# Patient Record
Sex: FEMALE | Race: BLACK OR AFRICAN AMERICAN | Employment: FULL TIME | URBAN - METROPOLITAN AREA
[De-identification: names, ages, dates, MRNs, and addresses within clinical notes are randomized per-mention and may not be internally consistent; named-entity substitution may affect disease eponyms.]

---

## 2017-08-20 ENCOUNTER — ED HISTORICAL/CONVERTED ENCOUNTER (OUTPATIENT)
Dept: OTHER | Age: 47
End: 2017-08-20

## 2017-10-04 ENCOUNTER — OP HISTORICAL/CONVERTED ENCOUNTER (OUTPATIENT)
Dept: OTHER | Age: 47
End: 2017-10-04

## 2017-10-06 ENCOUNTER — OP HISTORICAL/CONVERTED ENCOUNTER (OUTPATIENT)
Dept: OTHER | Age: 47
End: 2017-10-06

## 2017-11-08 ENCOUNTER — OP HISTORICAL/CONVERTED ENCOUNTER (OUTPATIENT)
Dept: OTHER | Age: 47
End: 2017-11-08

## 2017-11-15 ENCOUNTER — OP HISTORICAL/CONVERTED ENCOUNTER (OUTPATIENT)
Dept: OTHER | Age: 47
End: 2017-11-15

## 2017-11-26 ENCOUNTER — ED HISTORICAL/CONVERTED ENCOUNTER (OUTPATIENT)
Dept: OTHER | Age: 47
End: 2017-11-26

## 2019-11-07 ENCOUNTER — OP HISTORICAL/CONVERTED ENCOUNTER (OUTPATIENT)
Dept: OTHER | Age: 49
End: 2019-11-07

## 2020-01-07 ENCOUNTER — TELEPHONE (OUTPATIENT)
Dept: NEUROLOGY | Age: 50
End: 2020-01-07

## 2020-01-07 NOTE — TELEPHONE ENCOUNTER
Call patient and schedule for EMG/NCS BUE for CTS per MD     Left Message - on home/mobile number listed letting her know I was calling in regards to scheduling her for an EMG/NCS BUE for CTS. Asking for a call back.

## 2020-09-08 ENCOUNTER — NURSE TRIAGE (OUTPATIENT)
Dept: OBGYN CLINIC | Age: 50
End: 2020-09-08

## 2020-09-08 DIAGNOSIS — N63.10 MASSES OF BOTH BREASTS: Primary | ICD-10-CM

## 2020-09-08 DIAGNOSIS — N63.20 MASSES OF BOTH BREASTS: Primary | ICD-10-CM

## 2020-09-08 NOTE — TELEPHONE ENCOUNTER
Patient left a message on triage voicemail requesting orders be submitted for her 6 month follow up mammogram and ultrasound to [facility]. Spoke with patient and advised her the orders will be submitted today.

## 2020-09-21 ENCOUNTER — DOCUMENTATION ONLY (OUTPATIENT)
Dept: OBGYN CLINIC | Age: 50
End: 2020-09-21

## 2020-10-01 DIAGNOSIS — N63.10 MASSES OF BOTH BREASTS: ICD-10-CM

## 2020-10-01 DIAGNOSIS — N63.20 MASSES OF BOTH BREASTS: ICD-10-CM

## 2020-10-13 ENCOUNTER — TRANSCRIBE ORDER (OUTPATIENT)
Dept: SCHEDULING | Age: 50
End: 2020-10-13

## 2020-10-13 DIAGNOSIS — R20.2 PARESTHESIA: Primary | ICD-10-CM

## 2020-10-29 ENCOUNTER — APPOINTMENT (OUTPATIENT)
Dept: PHYSICAL THERAPY | Age: 50
End: 2020-10-29

## 2020-12-03 ENCOUNTER — HOSPITAL ENCOUNTER (OUTPATIENT)
Dept: PHYSICAL THERAPY | Age: 50
Discharge: HOME | End: 2020-12-03
Payer: MEDICAID

## 2020-12-03 PROCEDURE — 97163 PT EVAL HIGH COMPLEX 45 MIN: CPT

## 2020-12-03 NOTE — PROGRESS NOTES
[address] [address] [address] [facility] [address] Suite [facility], [facility]  Ph: [phone]    Fax: [phone]    Initial Evaluation/Plan of Care/Statement of Necessity for Physical Therapy Services     Patient name: Denae Clayton   : 1970  [x]  Patient  Verified Provider#: [id number]    Start of Care: 12/3/2020        Referral source: [facility] Return visit to MD: 2020     Medical/Treatment Diagnosis: Traumatic ischemia of muscle, [facility]. 6XXD]  Pain in leg, unspecified [M79.606]  Cramp and spasm [R25.2]  Pain in left thigh [M79.652]    Payor: [facility] MEDICAID / Plan: [address] [place] [address] / Product Type: Managed Care Medicaid /       Prior Hospitalization: see medical history     Comorbidities: None  Prior Level of Function: Independent without device   Medications: Verified on Patient Summary List          Patient / Family readiness to learn indicated by: asking questions, trying to perform skills and interest  Persons(s) to be included in education: patient (P)  Barriers to Learning/Limitations: None  Patient Self Reported Health Status: fair  Rehabilitation Potential: good  Previous Treatment/Compliance: None   PMHx/Surgical Hx:  appendex removed   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 Barriers to progress: None  Motivation: Fair   Substance use: N/A  Cognition: A & O x 4  Onset Date: July     In time: 1:40 pm  Out time:2:30 Total Treatment Time (min): 50   Total Timed Codes (min): 1 1:1 Treatment Time ([facility] only): 50  Visit #: 1 Visit count could not be calculated. Make sure you are using a visit which is associated with an episode. SUBJECTIVE  Patient was referred to physical therapy due to numbness at the back of her left tight.  She reports electric shookcing sensation which started in July following a fall where she lost her consciousness  and at the ER dx with a heart attack. She was admitted for 2 1/2 weeks, she was mostly bedbound for 2 weeks at the hospital and was not given PT or allowed to walk much  but she did received home PT for 3 weeks and was  walking initially with a walker, now she walks without device. Patient stated at hospital they dx her with Rhabdomyolysis following a blood test, initially both legs were numb and with tingling sensation. She is taking medication but still c/o of numbness and tingling sensation primarily on left. Patient stated her neurologist also did an EMG but everything came back normal.  Activities that produce pain:sitting on hard surface for more then 10-15 min, laying down and sometimes walking. Activities that makes her feel better: nothing- Stated meds do not help and she hasn't try ice or heat. Patient reports functional limitations with  Sleeping and lifting her 21 y/o daughter who has CP. Goals: \" To relief the pain\"   PAIN:  Area of pain: localized to posterior thigh (hamstring) stops at knee joint  Pain Level (0-10 scale)  At rest: 7/10  With activity: 10/10  Worst: 10/10  Least: 6/10  Pain Level (0-10 scale) pre treatment: 7/10  Pain Level (0-10 scale) post treatment: 6-7/10  OBJECTIVE  Physical Findings   Ortho:   Posture: slight decreased calf muscle tone on left >right   Gait and Functional Mobility:  Ambulated with out device. Palpation: TTP on hamstring muscle belly and slight discomfort on ischial tuberosity   Swelling: none but patient repots feeling slight more swelling on L>R hamstring muscle   Gross findings: Gross PROM on (L) LE WNL on right reports mild discomofrot on right LE     Specific joints: *normal values in ()  KNEE        AROM          PROM                       MMT   R L R L R L   Extension (0)      4+ 4+   Flexion (145)  Prone      5 4- ! Cramping sensation        HIP     AROM       PROM             MMT   R L R L R L   Flexion (120)     5 4-   Extension (15)     4 2+! Abduction (40)     5 5   Adduction (30)     4 4   IR (40)         ER (40)         Additional comments: C/o pain in her back and cramping  when trying to raise her (L) leg    ANKLE                               AROM                     PROM                     MMT:   R L R L R L   Dorsiflexion (15)      5 5   Plantarflexion (50)         Inversion (35)          Eversion (25)         Additional comments:   Mobility Assessment:      Neurological: Reflexes / Sensations: numbness from ischia tuberosity to hamstring muscle belly reports about 80% compared to right which is 100%. SLR mild weakness on L>R LE     Gait and Functional Mobility:  Transfers:   Bed mobility: Independent   Sit to/from Supine: Independent   Sit to/from stands: independent   Gait: 2 min walk test 347 ft no device, presents with decreased hip extension on left LE and reports tightness of her hamstring after 1 min of walking but was able to do 2 min. Assistive device:  None     Balance:   SLS right LE 10sec / Left LE 6 sec   Tendem = 30sec each side     Modality rationale: decrease edema, decrease inflammation, decrease pain and increase tissue extensibility to improve the patients ability to reduce thigh pain in sitting   Min Type Additional Details    [] Estim: []UnAtt   []Att       []TENS instruct                  []IFC  []Premod   []NMES []w/US   []w/ice   []w/heat  Position:                                     Location:    []  Ice     []  Heat  []  Ice massage Position:  Location:    []  Traction: [] Cervical       []Lumbar                       []Intermittent   []Continuous                     Lbs:  []W/heat               []W/heat and Estim    []  Ultrasound: []Continuous   [] Pulsed at:                           []1MHz   []3MHz Location:  W/cm2:    [] Skin assessment post-treatment:   []intact []redness- no adverse reaction   []redness - adverse reaction:     Decline ice post session.    5 min Therapeutic Exercise:  [x] See flow sheet :quad set, heel slides, bridges    Rationale: increase strength and improve coordination to improve the patients ability to reduce thigh pain. With   [x] TE   [] TA   [] neuro Patient Education: [x] Review HEP    [x] Progressed/Changed HEP based on:   [] positioning   [] body mechanics   [] transfers   [] heat/ice application    [] other:      ASSESSMENT/Changes in Function:   Patient is a 51 y/o female referred to OP physical therapy with dx of traumpatic Rhabdomyolysis and left leg pain. Patient primary complaint is cramping, numbness and tingling sensation on left hamstring muscle belly. She presents with  LE weakness,  noted decreased  hip extension strength, decreased balance and decreased sensation. Patient reports difficulty sitting for prolong periods, lifting and carrying for her disabled child. Patient will benefit from skilled PT services to improve static/dynamic balance, increased strength and overall mobility and IADL's. Patient POC with include patient education, HEP, there-ex, strengthening, flexibility, balance and gait training. Problem List/Impairments: pain affecting function, decrease strength, edema affecting function, impaired gait/ balance, decrease ADL/ functional abilitiies, decrease activity tolerance and decrease flexibility/ joint mobility  Frequency / Duration: Patient to be seen 1-2 times per week for 5-6  weeks. Certification Period: 12/3/2020-02/3/2020  Treatment Plan may include any combination of the following: Therapeutic exercise, Therapeutic activities, Neuromuscular re-education, Physical agent/modality, Gait/balance training, Manual therapy, Patient education and Functional mobility training    Patient/ Caregiver education and instruction: exercises  Patient Goal (s): To relief the pain  Short Term Goals: To be accomplished in 1-2 weeks:  1. Patient will be independent with HEP to augment POC  2.  Patient will strength in LEs will increased by 1-2 grades in 4-5 weeks  3. Patient will be able to perform a SLS in Left LE for 10 sec to improve balance and stability      Long Term Goals: To be accomplished in 5-6 weeks:  1. Patient will report decreased pain and discomfort on left thigh >=2/10 to improve her mobility   2. Patient will be able to report less discomfort and pain when lifting and carrying her disabled daughter   3. Patient will be able to sleep through the night without pain making her up to improve her quality of life. [x]  Plan of care has been reviewed with PTA. The Plan of Care is based on information from the initial evaluation. Lima Arzate, PT 12/3/2020   ________________________________________________________________________    I certify that the above Therapy Services are being furnished while the patient is under my care. I agree with the treatment plan and certify that this therapy is necessary.     [de-identified] Signature:____________________  Date:____________Time: _________

## 2020-12-07 ENCOUNTER — OFFICE VISIT (OUTPATIENT)
Dept: ENDOCRINOLOGY | Age: 50
End: 2020-12-07
Payer: MEDICAID

## 2020-12-07 VITALS
HEART RATE: 82 BPM | BODY MASS INDEX: 22.28 KG/M2 | TEMPERATURE: 98.7 F | DIASTOLIC BLOOD PRESSURE: 69 MMHG | WEIGHT: 121.1 LBS | SYSTOLIC BLOOD PRESSURE: 92 MMHG | HEIGHT: 62 IN

## 2020-12-07 DIAGNOSIS — R94.6 ABNORMAL THYROID FUNCTION TEST: Primary | ICD-10-CM

## 2020-12-07 PROBLEM — F41.9 ANXIETY AND DEPRESSION: Status: ACTIVE | Noted: 2020-12-07

## 2020-12-07 PROBLEM — F32.A ANXIETY AND DEPRESSION: Status: ACTIVE | Noted: 2020-12-07

## 2020-12-07 PROCEDURE — 99204 OFFICE O/P NEW MOD 45 MIN: CPT | Performed by: INTERNAL MEDICINE

## 2020-12-07 RX ORDER — DICLOFENAC SODIUM 75 MG/1
TABLET, DELAYED RELEASE ORAL
COMMUNITY
Start: 2020-11-06 | End: 2022-08-15

## 2020-12-07 RX ORDER — HYDROXYZINE PAMOATE 25 MG/1
CAPSULE ORAL
COMMUNITY
Start: 2020-09-16 | End: 2022-08-15

## 2020-12-07 RX ORDER — DULOXETIN HYDROCHLORIDE 60 MG/1
CAPSULE, DELAYED RELEASE ORAL
COMMUNITY
Start: 2020-12-02 | End: 2022-08-15

## 2020-12-07 RX ORDER — TRAZODONE HYDROCHLORIDE 100 MG/1
TABLET ORAL
COMMUNITY
Start: 2020-11-09 | End: 2022-08-15

## 2020-12-07 NOTE — PROGRESS NOTES
History and Physical    Patient: Tim Díaz MRN: [id number]  SSN: [id number]    YOB: 1970  Age: 52 y.o. Sex: female      Subjective:      Tim Díaz is a 52 y.o. female with past medical history of anxiety and depression is sent to me by primary care provider Zane Briceño for thyroid issues. Patient says she was diagnosed with slightly overactive thyroid a few years back. She is not able to give me a lot of details but she tells me that it was only slightly abnormal, so she was not given any treatment. However, she has been experiencing anxiety, palpitations, tremors, heat intolerance, frequent bowel movements, hair fall since past few months. Labs were abnormal in August 2020. Symptoms: Heat intolerance, tremors, palpitations, hair fall, 3-4 bowel movements every day. Patient does not have menstrual cycle because she had a hysterectomy in 2017. She also has insomnia, anxiety and depression for which she takes trazodone, Cymbalta. Prior history of thyroid problems: Yes, borderline hyperthyroid a few years back, no treatment was given  Recent steroid treatments: No  High dose Biotin supplemnts:  No  Recent URI:  No  Tenderness in neck:  No  Swelling in neck:  No  Family history of thyroid problems:  No  Personal/family history of autoimmune diseases:  No  smoking:  No  Change in appearance of eyes/ redness/ eye irritation: No  Personal history of cardiac disease: No  Personal history of osteoporosis/fragility fractures: No    History reviewed. No pertinent past medical history.   Past Surgical History:   Procedure Laterality Date    HX APPENDECTOMY      HX WISDOM TEETH EXTRACTION      VAG HYST,RMV TUBE/OVARY        Family History   Problem Relation Age of Onset    Sickle Cell Anemia Mother     Hypertension Father     Heart Failure Father     Heart Attack Brother     Seizures Brother      Social History     Tobacco Use    Smoking status: Never Smoker    Smokeless tobacco: Never Used   Substance Use Topics    Alcohol use: Not Currently      Prior to Admission medications    Medication Sig Start Date End Date Taking? Authorizing Provider   aspirin-calcium carbonate 81 mg-300 mg calcium(777 mg) tab 81 mg = 1 tab each dose, PO, daily with breakfast, # 30 tab, 11 Refills, [id number] 8/1/20  Yes Provider, Historical   diclofenac EC (VOLTAREN) 75 mg EC tablet take 1 tablet by mouth twice a day 11/6/20  Yes Provider, Historical   DULoxetine (CYMBALTA) 60 mg capsule take 1 capsule by mouth once daily 12/2/20  Yes Provider, Historical   traZODone (DESYREL) 100 mg tablet take 1 tablet by mouth at bedtime if needed 11/9/20  Yes Provider, Historical   hydrOXYzine pamoate (VISTARIL) 25 mg capsule take 1 capsule by mouth every 8 hours if needed 9/16/20  Yes Provider, Historical        No Known Allergies    Review of Systems:  ROS    A comprehensive review of systems was preformed and it is negative except mentioned in HPI    Objective:     Vitals:    12/07/20 1014   BP: 92/69   Pulse: 82   Temp: 98.7 °F (37.1 °C)   TempSrc: Temporal   Weight: 121 lb 1.6 oz (54.9 kg)   Height: 5' 2\" (1.575 m)        Physical Exam:    Physical Exam  Vitals signs and nursing note reviewed. Constitutional:       Appearance: Normal appearance. HENT:      Head: Normocephalic and atraumatic. Eyes:      Extraocular Movements: Extraocular movements intact. Neck:      Musculoskeletal: Neck supple. Comments: No thyromegaly, no nodules  Cardiovascular:      Rate and Rhythm: Normal rate and regular rhythm. Pulmonary:      Effort: Pulmonary effort is normal.      Breath sounds: Normal breath sounds. Abdominal:      General: Bowel sounds are normal.      Palpations: Abdomen is soft. Musculoskeletal:         General: No swelling. Skin:     General: Skin is warm and dry. Neurological:      General: No focal deficit present.       Mental Status: She is alert and oriented to person, place, and time. Psychiatric:         Mood and Affect: Mood normal.         Behavior: Behavior normal.         Thought Content: Thought content normal.         Judgment: Judgment normal.          Labs and Imaging:    Reviewed labs from the primary care physician:    8-:  TSH suppressed at 0.426 (0.45-4.5)  Free T4 normal at 1.8 (1.7-4.9)  Total T4 normal at 6.6  T3 uptake normal at 28    Normal WBC and liver enzymes  Last 3 Recorded Weights in this Encounter    12/07/20 1014   Weight: 121 lb 1.6 oz (54.9 kg)        No results found for: HBA1C, HGBE8, HGC1QNYZ, LZI5HGBI, ADH5UVQN     Assessment:     Patient Active Problem List   Diagnosis Code    Abnormal thyroid function test R94.6    Anxiety and depression F41.9, F32.9           Plan:     Abnormal thyroid function test:  I reviewed records including labs and notes from primary care physician.    8-:  TSH suppressed at 0.426 (0.45-4.5)  Free T4 normal at 1.8 (1.7-4.9)  Total T4 normal at 6.6  T3 uptake normal at 28    Normal WBC and liver enzymes    Clinically patient has symptoms of hyperthyroidism. Plan:  Check complete thyroid function panel and antibodies. After I review the results, I will decide if thyroid uptake scan and ultrasound is needed. Pulse rate is normal without any beta-blocker. I will see her back in my office in 3 weeks. Insomnia:  Patient is taking trazodone as needed. Anxiety and depression:  Patient is on Cymbalta per primary care provider.   Orders Placed This Encounter    TSH AND FREE T4    T3 TOTAL    THYROID STIMULATING IMMUNOGLOBULIN    TSH RECEPTOR AB    THYROID PEROXIDASE (TPO) AB        Signed By: Brittany Doss MD     December 7, 2020      Return to clinic 3 weeks

## 2020-12-08 LAB
T3 SERPL-MCNC: 70 NG/DL (ref 71–180)
T4 FREE SERPL-MCNC: 1.09 NG/DL (ref 0.82–1.77)
THYROPEROXIDASE AB SERPL-ACNC: <9 IU/ML (ref 0–34)
TSH RECEP AB SER-ACNC: <1.1 IU/L (ref 0–1.75)
TSH SERPL DL<=0.005 MIU/L-ACNC: 0.61 UIU/ML (ref 0.45–4.5)
TSI SER-ACNC: <0.1 IU/L (ref 0–0.55)

## 2020-12-17 ENCOUNTER — HOSPITAL ENCOUNTER (OUTPATIENT)
Dept: PHYSICAL THERAPY | Age: 50
Discharge: HOME | End: 2020-12-17
Payer: MEDICAID

## 2020-12-17 PROCEDURE — 97110 THERAPEUTIC EXERCISES: CPT

## 2020-12-17 NOTE — PROGRESS NOTES
PT DAILY TREATMENT NOTE - MCR     Patient Name: Arabella Delgado  Date:2020  : 1970  [x]  Patient  Verified  Payor: [address] / Plan: [address] [facility] / Product Type: Managed Care Medicaid /    Treatment Area: Pain in left thigh [M79.652]  Pain in leg, unspecified [M79.606]  Traumatic ischemia of muscle, subsequent encounter [facility]. 6XXD]   Next MD APPT:   In time:2:40   Out time: 3:25  Total Treatment Time (min): 45  Total Timed Codes (min): 30  1:1 Treatment Time ( [address] only): 30  Visit #:  Visit count could not be calculated. Make sure you are using a visit which is associated with an episode. SUBJECTIVE  Pain Level (0-10 scale) pre treatment: 6/10 in left leg and reports 8/10 LBP   Any medication changes, allergies to medications, adverse drug reactions, diagnosis change, or new procedure performed?: [x] No    [] Yes (see summary sheet for update)  Subjective functional status/changes:   [] No changes reported  Patient reports still having left leg pain and now her back started to bother her too. Also reports having constant dizziness, no s/o of double vision or recent falls. Stated she is drinking enough water at home. OBJECTIVE    Modality rationale: decrease pain and increase tissue extensibility to improve the patients ability to decreased pain with ambulation.    Min Type Additional Details    [] Estim: []UnAtt   []Att       []TENS instruct                  []IFC  []Premod   []NMES                     []Other:  []w/US   []w/ice   []w/heat  Position:  Location:   15 []  Ice     [x]  Heat  []  Ice massage Position:R side lying    Location:lower back    []  Traction: [] Cervical       []Lumbar                       [] Prone          []Supine                       []Intermittent   []Continuous Lbs:  []w/heat  []W/heat and Estim    []  Ultrasound: []Continuous   [] Pulsed at:                           []1MHz   []3MHz Location:  W/cm2:      [] Skin assessment post-treatment:   []intact  []redness- no adverse reaction   []redness - adverse reaction:   30 min Therapeutic Exercise:  [x] See flow sheet :Exercises program initiated    Rationale: increase ROM and increase strength to improve the patients ability to improve movement and reduce pain. With   [] TE   [] TA   [] neuro   [] other: Patient Education: [] Review HEP    [] Progressed/Changed HEP based on:   [] positioning   [] body mechanics   [] transfers   [] heat/ice application    [] other:      Other Objective/Functional Measures:   Reports about 90% loss in sensation on left hamstring muslclalit cardona  Reports discomfort on her lower back with hip extension, added a pillow under stomach for support with rest of prone activities. Pain Level (0-10 scale) post treatment: 4/10 reports some relief post heat    ASSESSMENT/Changes in Function:   Patient tolerated exercises and stretfches without report of pain or discomfort, patient stated she has more sensation issues on the her back of her leg. Therapist educated the sensation may or may not come back and regarding pain with sitting recommended to add a pillow or cushion for support. Patient encouraged to follow up with MD regarding both back pain and dizziness symtoms that she is reporting to expreience following the fall. Patient will continue to benefit from skilled PT services to modify and progress therapeutic interventions, address functional mobility deficits, address ROM deficits, address strength deficits, analyze and address soft tissue restrictions, analyze and cue movement patterns, analyze and modify body mechanics/ergonomics and assess and modify postural abnormalities to attain remaining goals. []  See Plan of Care  []  See progress note/recertification  []  See Discharge Summary       GOALS/Progress towards goals:      Short Term Goals: To be accomplished in 1-2 weeks:  1. Patient will be independent with HEP to augment POC  2. Patient will strength in LEs will increased by 1-2 grades in 4-5 weeks  3. Patient will be able to perform a SLS in Left LE for 10 sec to improve balance and stability      Long Term Goals: To be accomplished in 5-6 weeks:  1. Patient will report decreased pain and discomfort on left thigh >=2/10 to improve her mobility   2. Patient will be able to report less discomfort and pain when lifting and carrying her disabled daughter   3.  Patient will be able to sleep through the night without pain making her up to improve her quality of life.      PLAN  [x]  Upgrade activities as tolerated     []  Continue plan of care  []  Update interventions per flow sheet       []  Discharge due to:_  []  Other:_      Kimberlee Yanes, PT 12/17/2020

## 2020-12-21 ENCOUNTER — HOSPITAL ENCOUNTER (OUTPATIENT)
Dept: PHYSICAL THERAPY | Age: 50
Discharge: HOME | End: 2020-12-21
Payer: MEDICAID

## 2020-12-21 PROCEDURE — 97110 THERAPEUTIC EXERCISES: CPT

## 2020-12-21 NOTE — PROGRESS NOTES
PT DAILY TREATMENT NOTE - MCR     Patient Name: Huang Ontiveros  Date:2020  : 1970  [x]  Patient  Verified  Payor: Rojelio Sean / Plan: [facility] / Product Type: Managed Care Medicaid /    Treatment Area: Pain in left thigh [M79.652]  Pain in leg, unspecified [M79.606]  Traumatic ischemia of muscle, subsequent encounter Nathan Schuler. 6XXD]   Next MD APPT: Next week (no date provided)  In time: 1:02 pm Out time: 1:52pm   Total Treatment Time (min): 50  Total Timed Codes (min): 45  1:1 Treatment Time ( [address] only): 45  Visit #:  Visit count could not be calculated. Make sure you are using a visit which is associated with an episode. SUBJECTIVE  Pain Level (0-10 scale) pre treatment: 0/10 in left leg  Any medication changes, allergies to medications, adverse drug reactions, diagnosis change, or new procedure performed?: [x] No    [] Yes (see summary sheet for update)  Subjective functional status/changes:   [] No changes reported  Patient reports no pain but numbness at the back of her left thigh and stated she was having some cramps last night. Patient stated her dizziness is subsiding but she still has back pain, she called the doctor and left a message. OBJECTIVE    Modality rationale: decrease pain and increase tissue extensibility to improve the patients ability to decreased pain with ambulation.    Min Type Additional Details    [] Estim: []UnAtt   []Att       []TENS instruct                  []IFC  []Premod   []NMES                     []Other:  []w/US   []w/ice   []w/heat  Position:  Location:    []  Ice     []  Heat  []  Ice massage Position:R side lying    Location:lower back    []  Traction: [] Cervical       []Lumbar                       [] Prone          []Supine                       []Intermittent   []Continuous Lbs:  []w/heat  []W/heat and Estim    []  Ultrasound: []Continuous   [] Pulsed at:                           []1MHz   []3MHz Location:  W/cm2:      [] Skin assessment post-treatment:   []intact  []redness- no adverse reaction   []redness - adverse reaction:   45 min Therapeutic Exercise:  [x] See flow sheet :Exercises program initiated    Rationale: increase ROM and increase strength to improve the patients ability to improve movement and reduce pain. With   [] TE   [] TA   [] neuro   [] other: Patient Education: [] Review HEP    [] Progressed/Changed HEP based on:   [] positioning   [] body mechanics   [] transfers   [] heat/ice application    [] other:      Other Objective/Functional Measures: Added standing exercises and increased reps activties. Therapist provided with rest between set and provided water brakes during exercises. Patient educated on pacing. Pillow placed under abdomen with prone activitreis for back support    Pain Level (0-10 scale) post treatment: 0/10 reports some relief post heat    ASSESSMENT/Changes in Function:   Patient reprots she still feels weak and have sensation deficits on left thigh, she reports no difficulties with exercises, nor report of pain. She presents with L >R LE weakness and required verbal/tactile cues to avoid compensation with exercises. Patient encouraged to follow up with MD regarding both back pain and dizziness symtoms that she was reporting to expreience following the fall. Patient will continue to benefit from skilled PT services to modify and progress therapeutic interventions, address functional mobility deficits, address ROM deficits, address strength deficits, analyze and address soft tissue restrictions, analyze and cue movement patterns, analyze and modify body mechanics/ergonomics and assess and modify postural abnormalities to attain remaining goals. []  See Plan of Care  []  See progress note/recertification  []  See Discharge Summary       GOALS/Progress towards goals:      Short Term Goals: To be accomplished in 1-2 weeks:  1.  Patient will be independent with HEP to augment POC [] Met [] Not met [] Partially met   2. Patient will strength in LEs will increased by 1-2 grades in 4-5 weeks [] Met [] Not met [] Partially met   3. Patient will be able to perform a SLS in Left LE for 10 sec to improve balance and stability [] Met [] Not met [] Partially met      Long Term Goals: To be accomplished in 5-6 weeks:  1. Patient will report decreased pain and discomfort on left thigh >=2/10 to improve her mobility [] Met [] Not met [] Partially met   2. Patient will be able to report less discomfort and pain when lifting and carrying her disabled daughter [] Met [] Not met [] Partially met   3. Patient will be able to sleep through the night without pain making her up to improve her quality of life.  [] Met [] Not met [] Partially met      PLAN  [x]  Upgrade activities as tolerated     []  Continue plan of care  []  Update interventions per flow sheet       []  Discharge due to:_  []  Other:_      Hipolito Forman, PT 12/21/2020

## 2021-01-05 ENCOUNTER — OFFICE VISIT (OUTPATIENT)
Dept: ENDOCRINOLOGY | Age: 51
End: 2021-01-05
Payer: MEDICAID

## 2021-01-05 VITALS
HEART RATE: 93 BPM | WEIGHT: 138.6 LBS | SYSTOLIC BLOOD PRESSURE: 99 MMHG | BODY MASS INDEX: 25.51 KG/M2 | HEIGHT: 62 IN | TEMPERATURE: 98.6 F | DIASTOLIC BLOOD PRESSURE: 71 MMHG

## 2021-01-05 DIAGNOSIS — R94.6 ABNORMAL THYROID FUNCTION TEST: Primary | ICD-10-CM

## 2021-01-05 PROCEDURE — 99214 OFFICE O/P EST MOD 30 MIN: CPT | Performed by: INTERNAL MEDICINE

## 2021-01-05 RX ORDER — LIDOCAINE 50 MG/G
PATCH TOPICAL
COMMUNITY
Start: 2020-12-13 | End: 2022-08-15

## 2021-01-05 RX ORDER — METHOCARBAMOL 750 MG/1
TABLET, FILM COATED ORAL
COMMUNITY
Start: 2020-12-13 | End: 2022-08-15

## 2021-01-05 RX ORDER — METHYLPREDNISOLONE 4 MG/1
TABLET ORAL
COMMUNITY
Start: 2020-12-13 | End: 2022-04-25

## 2021-01-05 RX ORDER — CYCLOBENZAPRINE HCL 10 MG
TABLET ORAL
COMMUNITY
Start: 2020-12-26 | End: 2022-08-15

## 2021-01-05 RX ORDER — ASPIRIN 81 MG/1
TABLET ORAL
COMMUNITY
Start: 2021-01-02 | End: 2022-08-15

## 2021-01-05 NOTE — PROGRESS NOTES
History and Physical    Patient: Florencio Wang MRN: [id number]  SSN: [id number]    YOB: 1970  Age: 52 y.o. Sex: female      Subjective:      Florencio Wang is a 52 y.o. female with past medical history of anxiety and depression is here for follow-up of thyroid issues. She was initially sent to me by primary care provider Elizabeth Gutierrez. After the last visit patient had detailed thyroid labs done as well as thyroid antibodies checked and she is here to follow-up on the results. She denies any change in her symptoms since the last visit. Initial history:  Patient says she was diagnosed with slightly overactive thyroid a few years back. She is not able to give me a lot of details but she tells me that it was only slightly abnormal, so she was not given any treatment. However, she has been experiencing anxiety, palpitations, tremors, heat intolerance, frequent bowel movements, hair fall since past few months. Labs were abnormal in August 2020. Symptoms: Heat intolerance, tremors, palpitations, hair fall, 3-4 bowel movements every day. Patient does not have menstrual cycle because she had a hysterectomy in 2017. She also has insomnia, anxiety and depression for which she takes trazodone, Cymbalta.   Prior history of thyroid problems: Yes, borderline hyperthyroid a few years back, no treatment was given  Recent steroid treatments: No  High dose Biotin supplemnts:  No  Recent URI:  No  Tenderness in neck:  No  Swelling in neck:  No  Family history of thyroid problems:  No  Personal/family history of autoimmune diseases:  No  smoking:  No  Change in appearance of eyes/ redness/ eye irritation: No  Personal history of cardiac disease: No  Personal history of osteoporosis/fragility fractures: No    Past Medical History:   Diagnosis Date    Thyroid disease      Past Surgical History:   Procedure Laterality Date    HX APPENDECTOMY      HX WISDOM TEETH EXTRACTION      WA VAG HYST,RMV TUBE/OVARY        Family History   Problem Relation Age of Onset    Sickle Cell Anemia Mother     Hypertension Father     Heart Failure Father     Heart Attack Brother     Seizures Brother      Social History     Tobacco Use    Smoking status: Never Smoker    Smokeless tobacco: Never Used   Substance Use Topics    Alcohol use: Not Currently      Prior to Admission medications    Medication Sig Start Date End Date Taking? Authorizing Provider   aspirin delayed-release 81 mg tablet  1/2/21  Yes Provider, Historical   cyclobenzaprine (FLEXERIL) 10 mg tablet take 1 tablet by mouth at bedtime once daily if needed for 20 DAYS 12/26/20  Yes Provider, Historical   lidocaine (LIDODERM) 5 % APPLY ONE PATCH TRANSDERMALLY DAILY 12/13/20  Yes Provider, Historical   methocarbamoL (ROBAXIN) 750 mg tablet take 1 tablet by mouth four times a day 12/13/20  Yes Provider, Historical   methylPREDNISolone (MEDROL DOSEPACK) 4 mg tablet use as directed [address] 12/13/20  Yes Provider, Historical   diclofenac EC (VOLTAREN) 75 mg EC tablet take 1 tablet by mouth twice a day 11/6/20  Yes Provider, Historical   DULoxetine (CYMBALTA) 60 mg capsule take 1 capsule by mouth once daily 12/2/20  Yes Provider, Historical   traZODone (DESYREL) 100 mg tablet take 1 tablet by mouth at bedtime if needed 11/9/20  Yes Provider, Historical   hydrOXYzine pamoate (VISTARIL) 25 mg capsule take 1 capsule by mouth every 8 hours if needed 9/16/20  Yes Provider, Historical        No Known Allergies    Review of Systems:  ROS    A comprehensive review of systems was preformed and it is negative except mentioned in HPI    Objective:     Vitals:    01/05/21 1623   BP: 99/71   Pulse: 93   Temp: 98.6 °F (37 °C)   TempSrc: Temporal   Weight: 138 lb 9.6 oz (62.9 kg)   Height: 5' 2\" (1.575 m)        Physical Exam:    Physical Exam  Vitals signs and nursing note reviewed. Constitutional:       Appearance: Normal appearance. HENT:      Head: Normocephalic and atraumatic. Cardiovascular:      Rate and Rhythm: Normal rate and regular rhythm. Pulmonary:      Effort: Pulmonary effort is normal.      Breath sounds: Normal breath sounds. Neurological:      Mental Status: She is alert. Labs and Imaging:  Results for Kristin Dow (MRN [id number]) as of 1/5/2021 16:23   Ref. Range 12/7/2020 10:56   T4, Free Latest Ref Range: 0.82 - 1.77 ng/dL 1.09   T3, total Latest Ref Range: 71 - 180 ng/dL 70 (L)   TSH Latest Ref Range: 0.450 - 4.500 uIU/mL 0.613     Thyroid peroxidase Ab <9   0 - 34     Thyroid Stim Immunoglobulin <0.10   0.00 - 0.55     Thyrotropin Receptor Ab, serum <1.10   0.00 - 1.75         8-:  TSH suppressed at 0.426 (0.45-4.5)  Free T4 normal at 1.8 (1.7-4.9)  Total T4 normal at 6.6  T3 uptake normal at 28    Normal WBC and liver enzymes  Last 3 Recorded Weights in this Encounter    01/05/21 1623   Weight: 138 lb 9.6 oz (62.9 kg)        No results found for: HBA1C, HGBE8, HJY0VPXP, CJF1MZVF, KHT5VGGM     Assessment:     Patient Active Problem List   Diagnosis Code    Abnormal thyroid function test R94.6    Anxiety and depression F41.9, F32.9           Plan:     Abnormal thyroid function test:  8-:  TSH suppressed at 0.426 (0.45-4.5)  Free T4 normal at 1.8 (1.7-4.9)  Total T4 normal at 6.6  T3 uptake normal at 28    Normal WBC and liver enzymes    12-7-2020:  TSH normal at 0.63  Free T4 normal at 1.09  TPO, TSI and TRAB negative  Plan:  Since thyroid function test is completely normal now, no treatment is required. However, since TSH was slightly suppressed in the past, I would like to repeat labs in 3 months to make sure it continues to be normal.  Advised patient to talk to her primary care physician about optimizing treatment for anxiety. Insomnia:  Patient is taking trazodone as needed. Anxiety and depression:  Patient is on Cymbalta per primary care provider.   Orders Placed This Encounter    TSH AND FREE T4     Standing Status:   Future     Standing Expiration Date:   7/5/2021        Signed By: Agustina Mcmahon MD     January 5, 2021      Return to clinic 3 months

## 2021-01-05 NOTE — LETTER
1/5/2021 Patient: Nasim Montiel YOB: 1970 Date of Visit: 1/5/2021 Tevin Ku NP 
[address] [place] [id number] [address] Fax: [phone] Dear Tevin Ku NP, Thank you for referring Ms. Nasim Montiel to [address] [place] [address] for evaluation. My notes for this consultation are attached. If you have questions, please do not hesitate to call me. I look forward to following your patient along with you. Sincerely, Emmett Patel MD

## 2021-01-20 ENCOUNTER — HOSPITAL ENCOUNTER (OUTPATIENT)
Dept: PHYSICAL THERAPY | Age: 51
Discharge: HOME | End: 2021-01-20
Payer: MEDICAID

## 2021-01-20 PROCEDURE — 97162 PT EVAL MOD COMPLEX 30 MIN: CPT

## 2021-01-20 NOTE — PROGRESS NOTES
[address] [address] [facility] [address] [facility], [address] [place] [address]  Ph: [phone]    Fax: [phone]    Initial Evaluation/Plan of Care/Statement of Necessity for Physical Therapy Services     Patient name: Tk Walsh    Date/Start of Care:2021  : 1970  [x]  Patient  Verified Provider#: [id number]          Referral source: Eve Sosa PA-C Return visit to MD: 2 weeks from      Medical/Treatment Diagnosis: Low back pain [M54.5]   Payor: Ronda Engel / Plan: [employer] / Product Type: Managed Care Medicaid /       Prior Hospitalization: see medical history     Comorbidities: see chart   Prior Level of Function: Independent no device   Medications: Verified on Patient Summary List          Patient / Family readiness to learn indicated by: asking questions and trying to perform skills  Persons(s) to be included in education: patient (P)  Barriers to Learning/Limitations: None  Patient Self Reported Health Status: fair  Rehabilitation Potential: fair-good   Previous Treatment/Compliance: None   PMHx/Surgical Hx: Noon   Work Hx: child liliam of her daughter. Living Situation: Patient lives with  2 daugther. Barriers to progress: N/A  Motivation: Good  Substance use: N/A  Cognition: A & O x 4  Onset Date: 3 months ago    In time:11:32 Out time:12:25 Total Treatment Time (min): 53  Total Timed Codes (min): 10 1:1 Treatment Time ( only): 53  Visit #: 1 Visit count could not be calculated. Make sure you are using a visit which is associated with an episode. SUBJECTIVE  Patient reports  having back pain, that started about 3 months ago, no specific DALLIN. Stated she went to see an orthopedic and they did a xray and stated \"something is missing\" . As per  Patient it could be DJD, DDD but she doen't recall. Patient reports lower back pain on L>R side, reports difficulty with sweeping and mopping and carrying for her disabled child. Patient  reports having sharp pain when she bend forward it does not radiate and she does not report numbness or tingling sensation. Activities the produce the pain- banding, standing and walking >5 min   Activities that make it better - laying down, medication (she takes muscle relaxer) and sometimes sitting. Functional limitations: caring for her daughter, lifting and sweeping/mopping at home and doing her ADLs. PAIN:  Area of pain: Left >Right LS   Pain Level (0-10 scale)  At rest: 8/10 With activity: 10/10    Worst: 10/10  Least: 8/10   Pain Level (0-10 scale) pre treatment: 8/10  Pain Level (0-10 scale) post treatment: 8/10  Goals: \" To reduce the pain\"  OBJECTIVE:     Gait and Functional Mobility:  Transfers:  Bed mobility: independnet   Sit to/from Supine: independent  Sit to/from stands: independent   Rolling from prone to supine- reports increased back pain and difficulty  Gait: no major deficits noted.    Assistive device: ambulates without device     Physical Findings   Ortho:   Posture:  R pelvic anterior torsion, left pelvic retracted   Gait and Functional Mobility:  Ambulates without device   Palpation: TTP on L4 paraspinals with PA and on R/L side of L4 and left sacrum   Gross findings:    Swelling: N/A  Spine:   TRUNK ROM:     Flexion:                         [] N/A  []WNL  []Minimal  []Moderate  [x] Major 49 cm from third finger down to floor with increased pain immediately as was bending forward    Extension:                     [] N/A  []WNL  [x]Minimal  []Moderate  [] Major  Some relief   Right Lateral Flexion:    [] N/A  []WNL  []Minimal  [x]Moderate  [] Major  54cm from third finger down to floor with right side pain   Left Lateral Flexion:   [] N/A  []WNL  []Minimal  []Moderate  [] Major 61cm from third finger down to floor left side pain   Rotation to the Right:  [] N/A  [x]WNL  []Minimal  []Moderate  [] Major   Rotation to the Left:   [] N/A  [x]WNL  []Minimal  []Moderate  [] Major Right Side Glide:           [] N/A  []WNL  []Minimal  []Moderate  [] Major  Left Side Glide:   [] N/A  []WNL  []Minimal  []Moderate  [] Major  Additional comments:   Specific joints: *normal values in ()    Hip      AROM       PROM             MMT   R L R L R L   Flexion (120)     4- 4-   Extension (15)     3+p! 3+p! Abduction (40)     4- 4-   Adduction (30)     3+ 2+   IR (40)         ER (40)         Additional comments: reports pain in right /Left groin with hip abd   RHS flexibility 70deg   L HS flexibiltiy 65 deg     Knee          AROM          PROM                       MMT   R L R L R L   Extension (0)      4+ 4p! On quad    Flexion (145)     4 4- with pelvic rotaiton manual cues    Additional comments: L>R quad tightness     Ankle                                 AROM                       PROM                             MMT   R L R L R L   Dorsiflexion (15)     5  5   Plantarflexion (50)     4 4   Additional comments: able to do a few toe raise no discomfort on pain   Mobility Assessment:   Patient reports functional limitations with: banding, lifting and some ADLs     Neurological: Reflexes / Sensations: Reports discreased senation in her L HS about 90 % compared to right side. Able to walk on heels/toes without discomfort   Performs a deep squat but reports left side pain at initiation of movement. Balance:   SLS right LE 10sec / Left LE 6 sec   Tendem = 30sec each side     Special Tests:      Lumbar  Special Tests:    Trendelenberg: (-)    FABERS: R/L limited hip ER, reports  hip discomfort on hip flexor   Slump: (-)   H.S. SLR: (-)     Piriformis Ext: (-)    Marching: WNL     Modality rationale: decrease edema, decrease inflammation, decrease pain and increase tissue extensibility to improve the patients ability to move lumbar without pain.    Min Type Additional Details    [] Estim: []UnAtt   []Att       []TENS instruct                  []IFC  []Premod   []NMES []w/US   []w/ice []w/heat  Position:                                     Location:    []  Ice     []  Heat  []  Ice massage Position:  Location:    []  Traction: [] Cervical       []Lumbar                       []Intermittent   []Continuous                     Lbs:  []W/heat               []W/heat and Estim    []  Ultrasound: []Continuous   [] Pulsed at:                           []1MHz   []3MHz Location:  W/cm2:    [] Skin assessment post-treatment:   []intact []redness- no adverse reaction   []redness - adverse reaction:     * Decline heat post session  10 min Therapeutic Exercise:  [x] See flow sheet : HEP initiated and handout provided    Rationale: increase ROM, increase strength and improve coordination to improve the patients ability to improve back flexibility and mobility. With   [x] TE   [] TA   [] Neuro   [] SC   [] other: Patient Education: [x] Review HEP    [] Progressed/Changed HEP based on:   [] positioning   [] body mechanics   [] transfers   [] heat/ice application    [] other:      ASSESSMENT/Changes in Function:   Patient presents to OP skilled PT services with dx of lumbar back pain, patient reports x-ray was done and there is some abnormal structures as per patient. Patient presents with lumbar pain, decreased lumbar AROM , decreased LE strength, decreased core stability, decreased balance and LE flexibility. Patient may be experiencing with mechanical LBP she  will benefit from skilled PT services to address strength, core stability, flexible and balance to reduce pain and  improve ADLs and ambulation. Problem List/Impairments: pain affecting function, decrease ROM, decrease strength, edema affecting function, impaired gait/ balance, decrease ADL/ functional abilitiies and decrease flexibility/ joint mobility  Frequency / Duration: Patient to be seen 2 times per week for 12 treatments.   Certification Period: 1/20/21-3/30/21  Treatment Plan may include any combination of the following: Therapeutic exercise, Neuromuscular re-education, Physical agent/modality, Gait/balance training, Manual therapy, Patient education and Functional mobility training    Patient/ Caregiver education and instruction: exercises  Patient Goal (s): To reduce the pain   Short Term Goals: To be accomplished in 2-4  treatments:  1. Patient will be independent with her HEP to progress with her POC  2. Patient pain level will decreased on the VAS by 2-3 points   3. Patient HS /quad flexibility will improve to improve lumbar mobility   4. Patient will be able to perform a SLS 10 sec on each leg to improve her balance   5. Patient lumbar ROM will improve to minimal in all directions     Long Term Goals: To be accomplished in 12 treatments:  1. Patient will be able to carry her household ADLs activities with less pain and discomfort   2. Patient will be able to lift and care for her disabled child with decrease back pain   3. Patient will be able to stand/walk from then 10 min with less back pain and discomfort     [x]  Plan of care has been reviewed with PTA. The Plan of Care is based on information from the initial evaluation. Lima Arzate, PT 1/20/2021   ________________________________________________________________________    I certify that the above Therapy Services are being furnished while the patient is under my care. I agree with the treatment plan and certify that this therapy is necessary.     [de-identified] Signature:____________________  Date:____________Time: _________

## 2021-02-19 ENCOUNTER — HOSPITAL ENCOUNTER (OUTPATIENT)
Dept: PHYSICAL THERAPY | Age: 51
Discharge: HOME | End: 2021-02-19
Payer: MEDICAID

## 2021-02-19 PROCEDURE — 97110 THERAPEUTIC EXERCISES: CPT

## 2021-02-19 PROCEDURE — 97140 MANUAL THERAPY 1/> REGIONS: CPT

## 2021-02-19 NOTE — PROGRESS NOTES
PT DAILY TREATMENT NOTE - [facility]     Patient Name: Sophia Situ  Date:2021  : 1970  [x]  Patient  Verified  Payor: Juana Kaba / Plan: Suzanne Johns / Product Type: Managed Care Medicaid /    Treatment Area: Low back pain [M54.5]   Next MD APPT:   In  time:1:02 pm  Out time:1:49  Total Treatment Time (min): 45  Total Timed Codes (min): 45  1:1 Treatment Time ( [address] only): 45  Visit #:   Visit count could not be calculated. Make sure you are using a visit which is associated with an episode. SUBJECTIVE  Pain Level (0-10 scale) pre treatment: 8/10 Pain Level (0-10 scale) post treatment: 7/10  Any medication changes, allergies to medications, adverse drug reactions, diagnosis change, or new procedure performed?: [] No    [] Yes (see summary sheet for update)  Subjective functional status/changes:   [] No changes reported  Patient reports 8/10 left centralized lower back pain with movement, stated its really bad today. OBJECTIVE    Modality rationale: decrease edema, decrease inflammation, decrease pain, increase tissue extensibility and increase muscle contraction/control to improve the patients ability to reduce pain with ambulation. Min Type Additional Details    [] Estim: []UnAtt   []Att       []TENS instruct                  []IFC  []Premod   []NMES                     []Other:  []w/US   []w/ice   []w/heat  Position:  Location:    []  Ice     []  Heat  []  Ice massage Position:  Location:    []  Traction: [] Cervical       []Lumbar                       [] Prone          []Supine                       []Intermittent   []Continuous Lbs:  []w/heat  []W/heat and Estim    []  Ultrasound: []Continuous   [] Pulsed at:                           []1MHz   []3MHz Location:  W/cm2:      [] Skin assessment post-treatment:   []intact  []redness- no adverse reaction   []redness - adverse reaction:     * Decline heat or ES.   35 min Therapeutic Exercise:  [x] See flow sheet : Rationale: increase ROM, increase strength, improve coordination and improve balance to improve the patients ability to reduce pain with ambulation. 10  min Manual Therapy: Lumbar area    Rationale: decrease pain, increase ROM, increase tissue extensibility and decrease trigger points to improve the patients ability to reduce pain with mobility. With   [x] TE   [] TA   [] Neuro   [] SC   [] other: Patient Education: [] Review HEP    [] Progressed/Changed HEP based on:   [] positioning   [] body mechanics   [] transfers   [] Use of heat/ice    [] other:          Other Objective/Functional Measures:   POC for LBP was initiated today. Reports lumbar cramping on L side upon laying on bed. ASSESSMENT/Changes in Function:   Patient experienced left lumbar cramping during exercises, stated she's been having them frequently. Increaed tigthness noted with glut stretch on R>L LE, patient tolerated rest of exercises without pain, a pillow was placed under abdomen for support. Therapist provided HEP to continue to improve core stability and LE flexibility. Patient will continue to benefit from skilled PT services to modify and progress therapeutic interventions, address functional mobility deficits, address ROM deficits, address strength deficits, analyze and address soft tissue restrictions, analyze and cue movement patterns, analyze and modify body mechanics/ergonomics and assess and modify postural abnormalities to attain remaining goals. []  See Plan of Care  []  See progress note/recertification  []  See Discharge Summary       GOALS/Progress towards goals:      Patient/ Caregiver education and instruction: exercises  Patient Goal (s): To reduce the pain   Short Term Goals: To be accomplished in 2-4  treatments:  1. Patient will be independent with her HEP to progress with her POC [] Met [] Not met [] Partially met   2.  Patient pain level will decreased on the VAS by 2-3 points [] Met [] Not met [] Partially met   3. Patient HS /quad flexibility will improve to improve lumbar mobility [] Met [] Not met [] Partially met   4. Patient will be able to perform a SLS 10 sec on each leg to improve her balance [] Met [] Not met [] Partially met   5. Patient lumbar ROM will improve to minimal in all directions [] Met [] Not met [] Partially met      Long Term Goals: To be accomplished in 12 treatments:  1. Patient will be able to carry her household ADLs activities with less pain and discomfort [] Met [] Not met [] Partially met   2. Patient will be able to lift and care for her disabled child with decrease back pain [] Met [] Not met [] Partially met   3.  Patient will be able to stand/walk from then 10 min with less back pain and discomfort [] Met [] Not met [] Partially met        PLAN  []  Upgrade activities as tolerated     []  Continue plan of care  []  Update interventions per flow sheet       []  Discharge due to:_  []  Other:_      Gail Benitez DPT 2/19/2021

## 2021-03-03 ENCOUNTER — HOSPITAL ENCOUNTER (OUTPATIENT)
Dept: PHYSICAL THERAPY | Age: 51
Discharge: HOME | End: 2021-03-03
Payer: MEDICAID

## 2021-03-03 PROCEDURE — 97110 THERAPEUTIC EXERCISES: CPT

## 2021-03-03 NOTE — PROGRESS NOTES
PT DAILY TREATMENT NOTE - [facility]     Patient Name: Aleena Nguyen  Date:3/3/2021  : 1970  [x]  Patient  Verified  Payor: Rosalia Stein / Plan: Bekah Gold / Product Type: Managed Care Medicaid /    Treatment Area: Low back pain [M54.5]   Next MD APPT: 3/8/21   In  Time:09:55 am  Out time:10:35 am  Total Treatment Time (min): 40  Total Timed Codes (min): 40  1:1 Treatment Time ( only): 40  Visit #: 3/12  Visit count could not be calculated. Make sure you are using a visit which is associated with an episode. SUBJECTIVE  Pain Level (0-10 scale) pre treatment: 710   Pain Level (0-10 scale) post treatment: 6/10  Any medication changes, allergies to medications, adverse drug reactions, diagnosis change, or new procedure performed?: [] No    [] Yes (see summary sheet for update)  Subjective functional status/changes:   [] No changes reported  Patient stated her L calf cramps up on her every night. She also feels better when she is in extension. OBJECTIVE      40 min Therapeutic Exercise:  [x] See flow sheet :   Rationale: increase ROM, increase strength, improve coordination and improve balance to improve the patients ability to reduce pain with ambulation. min Manual Therapy: Lumbar area    Rationale: decrease pain, increase ROM, increase tissue extensibility and decrease trigger points to improve the patients ability to reduce pain with mobility. With   [x] TE   [] TA   [] Neuro   [] SC   [] other: Patient Education: [] Review HEP    [] Progressed/Changed HEP based on:   [] positioning   [] body mechanics   [] transfers   [] Use of heat/ice    [] other:          Other Objective/Functional Measures: Added calf stretch with towel    ASSESSMENT/Changes in Function:   Patient did have trigger points to medial side of calf. She was able to tolerate Myofascial release with good results. She did not complain of cramping with lumbar exercises today.  Discussed with patient using ice on her calf at night . She did well with exercises and reported decrease in pain post treatment. She declined modalities despite high pain levels. Patient will continue to benefit from skilled PT services to modify and progress therapeutic interventions, address functional mobility deficits, address ROM deficits, address strength deficits, analyze and address soft tissue restrictions, analyze and cue movement patterns, analyze and modify body mechanics/ergonomics and assess and modify postural abnormalities to attain remaining goals. []  See Plan of Care  []  See progress note/recertification  []  See Discharge Summary       GOALS/Progress towards goals:      Patient/ Caregiver education and instruction: exercises  Patient Goal (s): To reduce the pain   Short Term Goals: To be accomplished in 2-4  treatments:  1. Patient will be independent with her HEP to progress with her POC [] Met [] Not met [] Partially met   2. Patient pain level will decreased on the VAS by 2-3 points [] Met [] Not met [] Partially met   3. Patient HS /quad flexibility will improve to improve lumbar mobility [] Met [] Not met [] Partially met   4. Patient will be able to perform a SLS 10 sec on each leg to improve her balance [] Met [] Not met [] Partially met   5. Patient lumbar ROM will improve to minimal in all directions [] Met [] Not met [] Partially met      Long Term Goals: To be accomplished in 12 treatments:  1. Patient will be able to carry her household ADLs activities with less pain and discomfort [] Met [] Not met [] Partially met   2. Patient will be able to lift and care for her disabled child with decrease back pain [] Met [] Not met [] Partially met   3.  Patient will be able to stand/walk from then 10 min with less back pain and discomfort [] Met [] Not met [] Partially met        PLAN  []  Upgrade activities as tolerated     []  Continue plan of care  []  Update interventions per flow sheet       []  Discharge due to:_  []  Other:_      Abdoul November, PTA 3/3/2021

## 2021-04-05 DIAGNOSIS — R94.6 ABNORMAL THYROID FUNCTION TEST: ICD-10-CM

## 2021-05-07 NOTE — ANCILLARY DISCHARGE INSTRUCTIONS
[address] [address] [facility] [address] [facility], [address] [place] [address]  Ph: [phone]  Fax: [phone]    Discharge Summary 2-15    Patient name: Luis Hyde  : 1970  Provider#: [id number]  Referral source: Alexander Dias*      Medical/Treatment Diagnosis: Low back pain [M54.5]     Prior Hospitalization: see medical history     Comorbidities: See Plan of Care  Prior Level of Function: See Plan of Care  Medications: Verified on Patient Summary List  Start of Care:  12/3/20  Onset Date:chronic    Visits from Start of Care: 3  Missed Visits: 5    Reporting Period : 12/3/20 to     Assessment/Summary of care/GOALS:   Patient was D/C from therapy as her last session was in 3/3/21, therapist followed up with patient on  since new order from MD is required and authorization from insurance. We have not heard from patient yet she will be D/C at this time. Short Term Goals: To be accomplished in 2-4  treatments:  1. Patient will be independent with her HEP to progress with her POC []? Met []? Not met []? Partially met   2. Patient pain level will decreased on the VAS by 2-3 points []? Met []? Not met []? Partially met   3. Patient HS /quad flexibility will improve to improve lumbar mobility []? Met []? Not met []? Partially met   4. Patient will be able to perform a SLS 10 sec on each leg to improve her balance []? Met []? Not met []? Partially met   5. Patient lumbar ROM will improve to minimal in all directions []? Met []? Not met []? Partially met      Long Term Goals: To be accomplished in 12 treatments:  1. Patient will be able to carry her household ADLs activities with less pain and discomfort []? Met []? Not met []? Partially met   2. Patient will be able to lift and care for her disabled child with decrease back pain []? Met []? Not met []? Partially met   3. Patient will be able to stand/walk from then 10 min with less back pain and discomfort []?  Met []? Not met []?  Partially met        RECOMMENDATIONS:  [x]Discontinue therapy: []Patient has reached or is progressing toward set goals and is independent with HEP     [x]Patient is non-compliant or has abdicated     []Due to lack of appreciable progress towards set goals     []Other  Jaret Pierre, PT, DPT 5/7/2021

## 2021-08-27 VITALS — BODY MASS INDEX: 24.17 KG/M2 | HEIGHT: 64 IN

## 2022-01-25 ENCOUNTER — HOSPITAL ENCOUNTER (OUTPATIENT)
Dept: PHYSICAL THERAPY | Age: 52
Discharge: HOME | End: 2022-01-25
Payer: MEDICAID

## 2022-01-25 PROCEDURE — 97162 PT EVAL MOD COMPLEX 30 MIN: CPT

## 2022-01-25 NOTE — PROGRESS NOTES
[address] [address] [facility] [address] [facility], [address] [place] [address]  Ph: [phone]    Fax: [phone]    Initial Evaluation/Plan of Care/Statement of Necessity for Physical Therapy Services     Patient name: Meagan Kramer    Date/Start of Care:2022  : 1970  [x]  Patient  Verified  Provider#: [id number]          Referral source: Aries Green PA-C    Return visit to MD:  ~ 6 months. Medical/Treatment Diagnosis: Other low back pain [M54.59]    Payor: [facility] MEDICAID / Plan: [facility] / Product Type: Managed Care Medicaid /       Prior Hospitalization: see medical history     Comorbidities: see chart   Prior Level of Function: independent no device   Medications: Verified on Patient Summary List          Patient / Family readiness to learn indicated by: trying to perform skills and interest  Persons(s) to be included in education: patient (P)  Barriers to Learning/Limitations: None  Patient Self Reported Health Status: good  Rehabilitation Potential: fair  Previous Treatment/Compliance: non  PMHx/Surgical Hx: non listed by patient   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 Barriers to progress: chronic back pain  Motivation: good   Substance use: N/A   Cognition: A & O x 4  Onset Date:  A year ago()    SUBJECTIVE  Patient was referred to PT due to lower back pain that started about a year go, she did 3-4 cortizon injection, the last one was 2021, and they did not work. Patient did a x-ray and MRI and she was found to have severe degenerative disc disease . She reports having lower back pain and radicular pain towards the left posterior thigh with  numbness and tingling sensation. The pain is intermitted with achyness and occasional sharp discomfort. Activities that produce  pain: bending, lifting , sitting and getting up from a chair.  Activities that make it better: walking and laying down. Patient reports functional limitations with: doing household activities, any that has to do with bending such as mopping, washing her hair and bending forward and lifting her special needs daughter who is  32year old and she cares for her. Mechanism of Injury: unknown  Area of pain: lower back and radiates to left leg. Pain Descriptors:  Achyness, sharpness and tingling. Pain Level (0-10 scale)  At rest: 8/10  With activity: 10/10    Worst: 10/10  Least: 6/10     Goal: \" To alleviate some of the back pain\"  Objective/Functional Measures including ROM/MMT:   Physical Findings   Ortho:   Posture: slight increased WBOS and B feet ER, slight increased lumbar lordosis   Gait and Functional Mobility:  WNL  Palpation: TTP at L PSIS and left L4-L5 transverse processes   Gross findings:  Left handed   Swelling: none  Spine:   TRUNK ROM:     Flexion:                         [] N/A  []WNL  []Minimal  [x]Moderate  [] Major 45 cm from 43rd finger to floor p! Center of back and left PSIS and slight radicular pain down the leg  Extension:                     [] N/A  []WNL  []Minimal  [x]Moderate  [] Major hypomobility but no pain reported. Right Lateral Flexion:    [] N/A  []WNL  []Minimal  []Moderate  [] Major  59 cm from 3rd finger to floor p! Left Lateral Flexion:   [] N/A  []WNL  []Minimal  []Moderate  [] Major 53 cm from 3rd finger to floor p! Rotation to the Right:  [] N/A  []WNL  [x]Minimal  []Moderate  [] Major   Rotation to the Left:   [] N/A  []WNL  []Minimal  [x]Moderate  [] Major p!  On left side   Right Side Glide:           [] N/A  []WNL  []Minimal  []Moderate  [] Major  Left Side Glide:   [] N/A  []WNL  []Minimal  []Moderate  [] Major  Additional comments: reports pain with flexion and coming into extension but no pain with extension, pulling sensation and slight with SB  Specific joints: *normal values in ()    Hip      AROM       PROM             MMT   R L R L R L   Flexion (120)     4+ 4   Extension (15)     3- 4-p! LBP   Abduction (40)     4 4   Adduction (30)     3+ 3+   IR (40)         ER (40)         Additional comments:   PROM of B hip noted limited IR and ER motions    SLR reports some lower back pain above 50 deg but no radicular symptoms. Hamstring 90-90 flexibility test  (R) 74 deg   (L) 68 deg     Knee          AROM          PROM                       MMT   R L R L R L   Extension (0)      4 4p! In lower back    Flexion (145)     4 4-p! Additional comments: LBP with (L) knee flexion in prone   R quad flexability 100 deg - tightness and stabilization required to prevent pelvic rotation  (L) quad flexability 108 deg     Ankle                                 AROM                       PROM                             MMT   R L R L R L   Dorsiflexion (15)     4+ 4+   Plantarflexion (50)         Additional comments: great toe 4+/5   Mobility Assessment:     Patient reports functional limitations with: transfers, sitting and bending      Neurological: Reflexes / Sensations: reports decreased sensation on the left leg  Special Tests:      Lumbar  Special Tests:     FABERS:(R) hip pain    Slump: (-) tight hamstring    H.S. SLR: (-)    Piriformis Ext: L>R        Gait and Functional Mobility:  Transfers:   Bed mobility: independent  Sit to/from Supine: independent  Sit to/from stands: independent  Gait: WFL  Assistive device:  none  Gait speed: NT  TUG test: NT  Stairs: NT  Walk on heels - no pain  Walk on toes -mild discomfort in lower back   Squat - able to do a deep squat but has a wide GABRIELE, lift heels off the floor and reports lower back pain. Single leg stance: R 31 sec / L 7 sec ankle wobbly   Ampac Score:   34.55%  ASSESSMENT/Changes in Function:   Patient is a 55 y/o female presents to skilled PT services with dx of lumbar degenerative disc disease.  Patient presents with decreased lumbar AROM, decreased LE strength, decreased core stability, decreased balance, radicular symtoms and decreased flexibility. Patient will benefit from skilled PT services to address above. Problem List/Impairments: pain affecting function, decrease ROM, decrease strength, impaired gait/ balance, decrease ADL/ functional abilitiies, decrease activity tolerance, decrease flexibility/ joint mobility and decrease transfer abilities  Treatment Plan may include any combination of the following: Therapeutic exercise, Neuromuscular re-education, Physical agent/modality, Gait/balance training, Manual therapy, Patient education and Functional mobility training  Patient/ Caregiver education and instruction: exercises  Frequency / Duration: Patient to be seen 2 times per week for 12 -16  treatments. Certification Period: 1/25/22-4/25/22  Patient Goal (s): To alleviate some of the back pain    [] Met [] Not met [] Partially met   Short Term Goals: To be accomplished in 2-6  treatments. 1. Patient will be independent with her HEP to progress with POC. [] Met [] Not met [] Partially met   2. Patient will reports decreased back pain to < 4/10 on the VAS scale. [] Met [] Not met [] Partially met   Long Term Goals: To be accomplished in 12-16  treatments. 1. Patient strength will increase by 1/2 to 1 grade to improve mobility. [] Met [] Not met [] Partially met   2. Patient will be able to do a SLS >15 sec on the left LE . [] Met [] Not met [] Partially met   3. Patient will be able to demonstrate good lifting technique from the floor. [] Met [] Not met [] Partially met   4. Patient will be able to care for her 31 y/o special needs daughter and report decreased pain while doing her ADL's. [] Met [] Not met [] Partially met   5. Patient will be able to do her household activities and ADLs: such as mopping and washing her hair with report of decreased lower back pain.  [] Met [] Not met [] Partially met   6 Patient will be able to bend forwards and  objects from the floor with less back pain and discomfort. [] Met [] Not met [] Partially met     TODAY'S TREATMENT: EVALUATION completed   Visit #: 1  In time: 9:50  Out time: 10:40  Total Treatment Time (min): 50   Total Timed Codes (min): 0 1:1 Treatment Time ( only): 0  Pain Level (0-10 scale) pre treatment: 8/10  Pain Level (0-10 scale) post treatment: 8/10      With   [] TE   [] TA   [] Neuro   [] SC   [] other: Patient Education: [] Review HEP    [] Progressed/Changed HEP based on:   [] positioning   [] body mechanics   [] transfers   [] heat/ice application    [] other:        [x]  Plan of care has been reviewed with PTA. The Plan of Care is based on information from the initial evaluation. Lima Arzate, PT, DPT 1/25/2022   ________________________________________________________________________    I certify that the above Therapy Services are being furnished while the patient is under my care. I agree with the treatment plan and certify that this therapy is necessary.     Physician's Signature:_________________________________________________  Date:____________Time: ____________     [place] PA-C

## 2022-02-02 ENCOUNTER — HOSPITAL ENCOUNTER (OUTPATIENT)
Dept: PHYSICAL THERAPY | Age: 52
Discharge: HOME | End: 2022-02-02
Payer: MEDICAID

## 2022-02-02 PROCEDURE — 97110 THERAPEUTIC EXERCISES: CPT

## 2022-02-02 NOTE — PROGRESS NOTES
PT DAILY TREATMENT NOTE  [facility]     Patient Name: Sukhdev Manuel  Date:2022  : 1970  [x]  Patient  Verified  Payor: Kanwal Mi / Plan: [facility] / Product Type: Managed Care Medicaid /    Treatment Area: Other low back pain [M54.59]       Next MD APPT: after therapy  In time:9:05   Out time:9:48  Total Treatment Time (min):40   Visit #: -  Visit count could not be calculated. Make sure you are using a visit which is associated with an episode. SUBJECTIVE  Pain Level (0-10 scale) pre treatment: 6/10    Pain Level (0-10 scale) post treatment: 5/10  Any medication changes, allergies to medications, adverse drug reactions, diagnosis change, or new procedure performed?:   [x] No    [] Yes (see summary sheet for update)  Subjective functional status/changes:   [] No changes reported  Reports increased back pain today, stated its been \"kiling her\" and she has pain down her R groin. OBJECTIVE      40 min Therapeutic Exercise:  [x] See flow sheet :   Rationale: increase ROM, increase strength, improve coordination, improve balance and increase proprioception to improve the patients ability to reduce back/hip pain with mobility. With     [x] TE   [] TA   [] Neuro   [] SC   [] other: Patient Education: [] Review HEP    [] Progressed/Changed HEP based on:   [] positioning   [] body mechanics   [] transfers   [] Use of heat/ice     [] other:         Other Objective/Functional Measures:   POC was initiated   Reports some R hip pain at groin with movement  TTP at hip groin and noted limited hip IR with PROM on R>L and increased hip tightness. ASSESSMENT/Changes in Function:   Patient reports some back pain with exercises but it was not limiting her mobility, she reports more pain on R hip groin and was tender to palapate. We also note decreased hip IR>ER with PROM and pain patient may have some s/s of hip impingment ?    Patient will continue to benefit from skilled PT services to modify and progress therapeutic interventions, address functional mobility deficits, address ROM deficits, address strength deficits, analyze and address soft tissue restrictions, analyze and cue movement patterns, analyze and modify body mechanics/ergonomics and assess and modify postural abnormalities to attain remaining goals. GOALS/Progress towards goals:  Short Term Goals: To be accomplished in 2-6  treatments. 1. Patient will be independent with her HEP to progress with POC. [] Met [] Not met [] Partially met   2. Patient will reports decreased back pain to < 4/10 on the VAS scale. [] Met [] Not met [] Partially met     Long Term Goals: To be accomplished in 12-16  treatments. 1. Patient strength will increase by 1/2 to 1 grade to improve mobility. [] Met [] Not met [] Partially met   2. Patient will be able to do a SLS >15 sec on the left LE . [] Met [] Not met [] Partially met   3. Patient will be able to demonstrate good lifting technique from the floor. [] Met [] Not met [] Partially met   4. Patient will be able to care for her 31 y/o special needs daughter and report decreased pain while doing her ADL's. [] Met [] Not met [] Partially met   5. Patient will be able to do her household activities and ADLs: such as mopping and washing her hair with report of decreased lower back pain. [] Met [] Not met [] Partially met   6 Patient will be able to bend forwards and  objects from the floor with less back pain and discomfort.  [] Met [] Not met [] Partially met      PLAN  []  Upgrade activities as tolerated     []  Continue plan of care  []  Update interventions per flow sheet       []  Discharge due to:_  []  Other:_      Lima Arzate, PT, DPT 2/2/2022

## 2022-02-07 ENCOUNTER — HOSPITAL ENCOUNTER (OUTPATIENT)
Dept: PHYSICAL THERAPY | Age: 52
Discharge: HOME | End: 2022-02-07
Payer: MEDICAID

## 2022-02-07 PROCEDURE — 97110 THERAPEUTIC EXERCISES: CPT

## 2022-02-07 NOTE — PROGRESS NOTES
PT DAILY TREATMENT NOTE  NON [facility]     Patient Name: Pao Ribera  QLLZ:[id number]  : 1970  [x]  Patient  Verified  Payor: [address] / Plan: [address] [facility] / Product Type: Managed Care Medicaid /    Treatment Area: Other low back pain [M54.59]       Next MD APPT: after therapy  In time:9:15 am   Out time:09:53 am  Total Treatment Time (min):38 min   Visit #: 3 /12-16  Visit count could not be calculated. Make sure you are using a visit which is associated with an episode. SUBJECTIVE  Pain Level (0-10 scale) pre treatment: 710   Pain Level (0-10 scale) post treatment: /10  Any medication changes, allergies to medications, adverse drug reactions, diagnosis change, or new procedure performed?:   [x] No    [] Yes (see summary sheet for update)  Subjective functional status/changes:   [] No changes reported  Patient does not report any pain to groin today only L side of lumbar spine. OBJECTIVE      38 min Therapeutic Exercise:  [x] See flow sheet :   Rationale: increase ROM, increase strength, improve coordination, improve balance and increase proprioception to improve the patients ability to reduce back/hip pain with mobility. With     [x] TE   [] TA   [] Neuro   [] SC   [] other: Patient Education: [x] Review HEP    [] Progressed/Changed HEP based on:   [] positioning   [] body mechanics   [] transfers   [] Use of heat/ice     [x] other: Gave HO HEP copy in chart        Other Objective/Functional Measures: Increased reps, added prone leg ext, prone knee flexion and prone heel squeeze. .    ASSESSMENT/Changes in Function:   Patient did not complain of pain with exercises today. She was able to tolerate new exercises without discomfort or pain. She reported per subjective that pain was on L side of lumbar spine. Patient did need verbal cues to slow down and hold end count longer. She was given HO of HEP and copy in chart.  She declined any modalities despite high pain levels pre and post treatment session ( remain same). Patient will continue to benefit from skilled PT services to modify and progress therapeutic interventions, address functional mobility deficits, address ROM deficits, address strength deficits, analyze and address soft tissue restrictions, analyze and cue movement patterns, analyze and modify body mechanics/ergonomics and assess and modify postural abnormalities to attain remaining goals. GOALS/Progress towards goals:    Patient Goal (s): To alleviate some of the back pain    []? Met []? Not met []? Partially met    Short Term Goals: To be accomplished in 2-6  treatments. 1. Patient will be independent with her HEP to progress with POC. [] Met [] Not met [] Partially met   2. Patient will reports decreased back pain to < 4/10 on the VAS scale. [] Met [] Not met [] Partially met     Long Term Goals: To be accomplished in 12-16  treatments. 1. Patient strength will increase by 1/2 to 1 grade to improve mobility. [] Met [] Not met [] Partially met   2. Patient will be able to do a SLS >15 sec on the left LE . [] Met [] Not met [] Partially met   3. Patient will be able to demonstrate good lifting technique from the floor. [] Met [] Not met [] Partially met   4. Patient will be able to care for her 33 y/o special needs daughter and report decreased pain while doing her ADL's. [] Met [] Not met [] Partially met   5. Patient will be able to do her household activities and ADLs: such as mopping and washing her hair with report of decreased lower back pain. [] Met [] Not met [] Partially met   6 Patient will be able to bend forwards and  objects from the floor with less back pain and discomfort.  [] Met [] Not met [] Partially met     Treatment Plan may include any combination of the following: Therapeutic exercise, Neuromuscular re-education, Physical agent/modality, Gait/balance training, Manual therapy, Patient education and Functional mobility training    Frequency / Duration: Patient to be seen 2 times per week for 12 -16  treatments.     Certification Period: 1/25/22-4/25/22    PLAN  [x]  Upgrade activities as tolerated     [x]  Continue plan of care  [x]  Update interventions per flow sheet       []  Discharge due to:_  []  Other:_      Jenna Ly, PTA 2/7/2022

## 2022-02-09 ENCOUNTER — APPOINTMENT (OUTPATIENT)
Dept: PHYSICAL THERAPY | Age: 52
End: 2022-02-09
Payer: MEDICAID

## 2022-02-14 ENCOUNTER — APPOINTMENT (OUTPATIENT)
Dept: PHYSICAL THERAPY | Age: 52
End: 2022-02-14
Payer: MEDICAID

## 2022-02-15 ENCOUNTER — HOSPITAL ENCOUNTER (OUTPATIENT)
Dept: PHYSICAL THERAPY | Age: 52
Discharge: HOME | End: 2022-02-15
Payer: MEDICAID

## 2022-02-15 PROCEDURE — 97110 THERAPEUTIC EXERCISES: CPT

## 2022-02-15 NOTE — PROGRESS NOTES
PT DAILY TREATMENT NOTE  NON [facility]     Patient Name: Sal Irwin  Date:2/15/2022  : 1970  [x]  Patient  Verified  Payor: Lucy [employer] / Plan: [facility] / Product Type: Managed Care Medicaid /    Treatment Area: Other low back pain [M54.59]       Next MD APPT: after therapy  In time:9:10 am   Out time: 09:55  am  Total Treatment Time (min):45    min   Visit #: -  Visit count could not be calculated. Make sure you are using a visit which is associated with an episode. SUBJECTIVE  Pain Level (0-10 scale) pre treatment: 7-8/10   Pain Level (0-10 scale) post treatment: 7/10    Any medication changes, allergies to medications, adverse drug reactions, diagnosis change, or new procedure performed?:   [x] No    [] Yes (see summary sheet for update)  Subjective functional status/changes:   [] No changes reported  Initially came in reporting 10/10 pain level, stated its too bad, she should have gone to the ER last night. When therapist asked if the pain is that high will be able to do exercises, patient stated yes. Therapist reviewed the VAS scale and then she pointed to between 7-8/10 pain level. Patient stated it in her lower back. Stated exercises are not helping. Patient arrived 10 min late. OBJECTIVE      45 min Therapeutic Exercise:  [x] See flow sheet :   Rationale: increase ROM, increase strength, improve coordination, improve balance and increase proprioception to improve the patients ability to reduce back/hip pain with mobility. With     [x] TE   [] TA   [] Neuro   [] SC   [] other: Patient Education: [x] Review HEP    [] Progressed/Changed HEP based on:   [] positioning   [] body mechanics   [] transfers   [] Use of heat/ice     [x] other: Gave HO HEP copy in chart        Other Objective/Functional Measures:  Cont with POC  Hip abd in SL, post tilt with marching, crunches  Added planks on knees and finished with passive quad, SKTC and hamstring stretch. ASSESSMENT/Changes in Function:   Cues for technique with exercises required and for control movement. Patient did not complain of pain with exercises today she was able to tolerate previous and new exercises. We progressed with core stability and LE strengthening we note (L) >R LE weakness and tightness with activities and decreased core stability. with prone exercises pillows placed under abdomen for support. Next visit we will start teaching about lifting techniques. Patient declined any modalities despite high pain levels pre and post treatment session ( remain almost the same). Patient will continue to benefit from skilled PT services to modify and progress therapeutic interventions, address functional mobility deficits, address ROM deficits, address strength deficits, analyze and address soft tissue restrictions, analyze and cue movement patterns, analyze and modify body mechanics/ergonomics and assess and modify postural abnormalities to attain remaining goals. GOALS/Progress towards goals:    Patient Goal (s): To alleviate some of the back pain    []? Met []? Not met []? Partially met    Short Term Goals: To be accomplished in 2-6  treatments. 1. Patient will be independent with her HEP to progress with POC. [x] Met [] Not met [] Partially met (stated she does them but they don't help)   2. Patient will reports decreased back pain to < 4/10 on the VAS scale. [] Met [] Not met [] Partially met     Long Term Goals: To be accomplished in 12-16  treatments. 1. Patient strength will increase by 1/2 to 1 grade to improve mobility. [] Met [] Not met [] Partially met   2. Patient will be able to do a SLS >15 sec on the left LE . [] Met [] Not met [] Partially met   3. Patient will be able to demonstrate good lifting technique from the floor. [] Met [] Not met [] Partially met   4.  Patient will be able to care for her 31 y/o special needs daughter and report decreased pain while doing her ADL's. [] Met [] Not met [] Partially met   5. Patient will be able to do her household activities and ADLs: such as mopping and washing her hair with report of decreased lower back pain. [] Met [] Not met [] Partially met   6 Patient will be able to bend forwards and  objects from the floor with less back pain and discomfort. [] Met [] Not met [] Partially met     Treatment Plan may include any combination of the following: Therapeutic exercise, Neuromuscular re-education, Physical agent/modality, Gait/balance training, Manual therapy, Patient education and Functional mobility training    Frequency / Duration: Patient to be seen 2 times per week for 12 -16  treatments.     Certification Period: 1/25/22-4/25/22    PLAN  [x]  Upgrade activities as tolerated     [x]  Continue plan of care  [x]  Update interventions per flow sheet       []  Discharge due to:_  []  Other:_      Lima Arzate, PT, DPT 2/15/2022

## 2022-02-22 ENCOUNTER — HOSPITAL ENCOUNTER (OUTPATIENT)
Dept: PHYSICAL THERAPY | Age: 52
Discharge: HOME | End: 2022-02-22
Payer: MEDICAID

## 2022-02-22 PROCEDURE — 97110 THERAPEUTIC EXERCISES: CPT

## 2022-02-22 NOTE — PROGRESS NOTES
PT DAILY TREATMENT NOTE  NON [facility]     Patient Name: Fareed Hendricks  Date:2022  : 1970  [x]  Patient  Verified  Payor: Angélica Marino / Plan: [address] [facility] / Product Type: Managed Care Medicaid /    Treatment Area: Other low back pain [M54.59]       Next MD APPT: after therapy  In time:9:20 am   Out time: 10:00  am  Total Treatment Time (min):40  min   Visit #:      SUBJECTIVE  Pain Level (0-10 scale) pre treatment: 6/10   Pain Level (0-10 scale) post treatment: 7/10    Any medication changes, allergies to medications, adverse drug reactions, diagnosis change, or new procedure performed?:   [x] No    [] Yes (see summary sheet for update)  Subjective functional status/changes:   [] No changes reported  Patient stated she does not think therapy is helping at all. \"I hurt more than when I started. Do you think I need to follow up with my doctor. \"    OBJECTIVE      40 min Therapeutic Exercise:  [x] See flow sheet :   Rationale: increase ROM, increase strength, improve coordination, improve balance and increase proprioception to improve the patients ability to reduce back/hip pain with mobility. With     [x] TE   [] TA   [] Neuro   [] SC   [] other: Patient Education: [x] Review HEP    [] Progressed/Changed HEP based on:   [] positioning   [x] body mechanics   [] transfers   [] Use of heat/ice     [x] other: Gave HO of proper body mechanics and had patient demo x 5        Other Objective/Functional Measures: instructed patient to put her phone down during treatment session  Cont with POC   Reviewed body mechanics and instructed patient to demo x 5    ASSESSMENT/Changes in Function:   Despite high pain levels patient did not have any difficulty performing exercises. She also needed verbal cues for all exercises for proper technique and to engage TA bracing. Instructed patient in proper body mechanics and had patient perform with min weight.  Patient was given HO and copy is in chart on body mechanics. Patient declined any modalities despite high pain levels pre and post treatment session ( remain almost the same). Patient will continue to benefit from skilled PT services to modify and progress therapeutic interventions, address functional mobility deficits, address ROM deficits, address strength deficits, analyze and address soft tissue restrictions, analyze and cue movement patterns, analyze and modify body mechanics/ergonomics and assess and modify postural abnormalities to attain remaining goals. GOALS/Progress towards goals:    Patient Goal (s): To alleviate some of the back pain    []? Met []? Not met []? Partially met    Short Term Goals: To be accomplished in 2-6  treatments. 1. Patient will be independent with her HEP to progress with POC. [x] Met [] Not met [] Partially met (stated she does them but they don't help)   2. Patient will reports decreased back pain to < 4/10 on the VAS scale. [] Met [] Not met [] Partially met     Long Term Goals: To be accomplished in 12-16  treatments. 1. Patient strength will increase by 1/2 to 1 grade to improve mobility. [] Met [] Not met [] Partially met   2. Patient will be able to do a SLS >15 sec on the left LE . [] Met [] Not met [] Partially met   3. Patient will be able to demonstrate good lifting technique from the floor. [x] Met [] Not met [] Partially met   4. Patient will be able to care for her 31 y/o special needs daughter and report decreased pain while doing her ADL's. [] Met [] Not met [] Partially met   5. Patient will be able to do her household activities and ADLs: such as mopping and washing her hair with report of decreased lower back pain. [] Met [x] Not met [] Partially met   6 Patient will be able to bend forwards and  objects from the floor with less back pain and discomfort.  [] Met [x] Not met [] Partially met     Treatment Plan may include any combination of the following: Therapeutic exercise, Neuromuscular re-education, Physical agent/modality, Gait/balance training, Manual therapy, Patient education and Functional mobility training    Frequency / Duration: Patient to be seen 2 times per week for 12 -16  treatments.     Certification Period: 1/25/22-4/25/22    PLAN  [x]  Upgrade activities as tolerated     [x]  Continue plan of care  [x]  Update interventions per flow sheet       []  Discharge due to:_  []  Other:_      Jenna Ly, PTA 2/22/2022

## 2022-02-24 ENCOUNTER — HOSPITAL ENCOUNTER (OUTPATIENT)
Dept: PHYSICAL THERAPY | Age: 52
Discharge: HOME | End: 2022-02-24
Payer: MEDICAID

## 2022-02-24 PROCEDURE — 97110 THERAPEUTIC EXERCISES: CPT

## 2022-02-24 NOTE — PROGRESS NOTES
PT DAILY TREATMENT NOTE  NON [facility]     Patient Name: Marcella Pina  Date:2022  : 1970  [x]  Patient  Verified  Payor: Arvind Gruber / Plan: [address] [facility] / Product Type: Managed Care Medicaid /    Treatment Area: Other low back pain [M54.59]       Next MD APPT: after therapy  In time:9:20 am   Out time: 10:01  am  Total Treatment Time (min):40  min   Visit #: -     SUBJECTIVE  Pain Level (0-10 scale) pre treatment: 7/10   Pain Level (0-10 scale) post treatment: 10    Any medication changes, allergies to medications, adverse drug reactions, diagnosis change, or new procedure performed?:   [x] No    [] Yes (see summary sheet for update)  Subjective functional status/changes:   [] No changes reported  Patient still has not called MD about follow up appointment. She indicates therapy has not helped at all and is only making it worse. She reports increase pain when she bends over. Discussed with patient not to bend over especially when lifting. Referred patient back to HO given on proper body mechanics and different options on bending over and lifting. Patient complains of pain to L side of Lumbar spine PSIS. She declines modalities and asked if she uses heat or ice at home and she indicated no. OBJECTIVE      40 min Therapeutic Exercise:  [x] See flow sheet :   Rationale: increase ROM, increase strength, improve coordination, improve balance and increase proprioception to improve the patients ability to reduce back/hip pain with mobility. With     [x] TE   [] TA   [] Neuro   [] SC   [] other: Patient Education: [x] Review HEP    [] Progressed/Changed HEP based on:   [] positioning   [x] body mechanics   [] transfers   [] Use of heat/ice     [x] other: Gave HO of proper  mechanics for household activities and demo for patient . Also reviewed body mechanics sheet again.         Other Objective/Functional Measures:  Discussed with patient to use ice or heat at home for pain (declined modalities ) Also to review HO on proper body mechanics and household activities mechanics. ASSESSMENT/Changes in Function:   Patient has only attended 6 visits and reports no improvement. She complains of pain when bending over to L PSIS. Referred patient back to body mechanics HO and des she did on previous visit. Also gave HO of household activity mechanics . Discussed using a back brace for support when lifting as well. Patient declined modalities despite high pain levels and does not use anything at home to help decrease pain levels. Instructed on cp/heat prn for pain. Also referred patient back to [facility] 2 for follow up. Patient does not have any difficulty or report increase in pain when performing exercises. Patient will continue to benefit from skilled PT services to modify and progress therapeutic interventions, address functional mobility deficits, address ROM deficits, address strength deficits, analyze and address soft tissue restrictions, analyze and cue movement patterns, analyze and modify body mechanics/ergonomics and assess and modify postural abnormalities to attain remaining goals. GOALS/Progress towards goals:    Patient Goal (s): To alleviate some of the back pain    []? Met []? Not met []? Partially met    Short Term Goals: To be accomplished in 2-6  treatments. 1. Patient will be independent with her HEP to progress with POC. [x] Met [] Not met [] Partially met (stated she does them but they don't help)   2. Patient will reports decreased back pain to < 4/10 on the VAS scale. [] Met [x] Not met [] Partially met     Long Term Goals: To be accomplished in 12-16  treatments. 1. Patient strength will increase by 1/2 to 1 grade to improve mobility. [] Met [] Not met [] Partially met   2. Patient will be able to do a SLS >15 sec on the left LE . [] Met [] Not met [] Partially met   3. Patient will be able to demonstrate good lifting technique from the floor.  [x] Met [] Not met [] Partially met   4. Patient will be able to care for her 33 y/o special needs daughter and report decreased pain while doing her ADL's. [] Met [x] Not met [] Partially met   5. Patient will be able to do her household activities and ADLs: such as mopping and washing her hair with report of decreased lower back pain. [] Met [x] Not met [] Partially met Gave patient HO on proper technique for  Household activities. 6 Patient will be able to bend forwards and  objects from the floor with less back pain and discomfort. [] Met [x] Not met [] Partially met     Treatment Plan may include any combination of the following: Therapeutic exercise, Neuromuscular re-education, Physical agent/modality, Gait/balance training, Manual therapy, Patient education and Functional mobility training    Frequency / Duration: Patient to be seen 2 times per week for 12 -16  treatments.     Certification Period: 1/25/22-4/25/22    PLAN  [x]  Upgrade activities as tolerated     [x]  Continue plan of care  [x]  Update interventions per flow sheet       []  Discharge due to:_  []  Other:_      Jenna Ly, PTA 2/24/2022

## 2022-03-01 ENCOUNTER — APPOINTMENT (OUTPATIENT)
Dept: PHYSICAL THERAPY | Age: 52
End: 2022-03-01

## 2022-03-19 PROBLEM — F32.A ANXIETY AND DEPRESSION: Status: ACTIVE | Noted: 2020-12-07

## 2022-03-19 PROBLEM — R94.6 ABNORMAL THYROID FUNCTION TEST: Status: ACTIVE | Noted: 2020-12-07

## 2022-03-19 PROBLEM — F41.9 ANXIETY AND DEPRESSION: Status: ACTIVE | Noted: 2020-12-07

## 2022-04-25 ENCOUNTER — OFFICE VISIT (OUTPATIENT)
Dept: OBGYN CLINIC | Age: 52
End: 2022-04-25
Payer: MEDICAID

## 2022-04-25 VITALS
HEIGHT: 64 IN | SYSTOLIC BLOOD PRESSURE: 108 MMHG | WEIGHT: 138.38 LBS | BODY MASS INDEX: 23.63 KG/M2 | DIASTOLIC BLOOD PRESSURE: 66 MMHG

## 2022-04-25 DIAGNOSIS — N95.1 MENOPAUSAL SYNDROME: ICD-10-CM

## 2022-04-25 DIAGNOSIS — Z11.3 SCREENING EXAMINATION FOR VENEREAL DISEASE: ICD-10-CM

## 2022-04-25 DIAGNOSIS — Z12.4 SCREENING FOR MALIGNANT NEOPLASM OF CERVIX: Primary | ICD-10-CM

## 2022-04-25 DIAGNOSIS — Z01.419 ROUTINE GYNECOLOGICAL EXAMINATION: ICD-10-CM

## 2022-04-25 DIAGNOSIS — N89.8 VAGINAL ITCHING: ICD-10-CM

## 2022-04-25 PROCEDURE — 99396 PREV VISIT EST AGE 40-64: CPT | Performed by: OBSTETRICS & GYNECOLOGY

## 2022-04-25 RX ORDER — FLUCONAZOLE 150 MG/1
150 TABLET ORAL DAILY
Qty: 1 TABLET | Refills: 0 | Status: SHIPPED | OUTPATIENT
Start: 2022-04-25 | End: 2022-04-26

## 2022-04-25 NOTE — PROGRESS NOTES
Gale Cross is a [phone], 54 y.o. female   No LMP recorded. Patient is postmenopausal.    She presents for her annual    She is having no significant problems. Notes some internal itching      Menstrual status:  Cycles are menopausal.    Flow: absent. She does not have dysmenorrhea. Medical conditions:  Since her last annual GYN exam about one year ago, she has not the following changes in her health history: none. Mammogram History:    JAROD Results (most recent):  No results found for this or any previous visit. DEXA Results (most recent):  No results found for this or any previous visit. Past Medical History:   Diagnosis Date    Thyroid disease      Past Surgical History:   Procedure Laterality Date    HX APPENDECTOMY      HX PELVIC LAPAROSCOPY      HX SALPINGO-OOPHORECTOMY      LS/RSO    HX WISDOM TEETH EXTRACTION      FL VAG HYST,RMV TUBE/OVARY         Prior to Admission medications    Medication Sig Start Date End Date Taking? Authorizing Provider   fluconazole (DIFLUCAN) 150 mg tablet Take 1 Tablet by mouth daily for 1 day. FDA advises cautious prescribing of oral fluconazole in pregnancy.  4/25/22 4/26/22 Yes Kasie Medina MD   aspirin delayed-release 81 mg tablet  1/2/21  Yes Provider, Historical   cyclobenzaprine (FLEXERIL) 10 mg tablet take 1 tablet by mouth at bedtime once daily if needed for 20 DAYS 12/26/20  Yes Provider, Historical   lidocaine (LIDODERM) 5 % APPLY ONE PATCH TRANSDERMALLY DAILY 12/13/20  Yes Provider, Historical   methocarbamoL (ROBAXIN) 750 mg tablet take 1 tablet by mouth four times a day 12/13/20  Yes Provider, Historical   diclofenac EC (VOLTAREN) 75 mg EC tablet take 1 tablet by mouth twice a day 11/6/20  Yes Provider, Historical   DULoxetine (CYMBALTA) 60 mg capsule take 1 capsule by mouth once daily 12/2/20  Yes Provider, Historical   traZODone (DESYREL) 100 mg tablet take 1 tablet by mouth at bedtime if needed 11/9/20  Yes Provider, Historical   hydrOXYzine pamoate (VISTARIL) 25 mg capsule take 1 capsule by mouth every 8 hours if needed 9/16/20  Yes Provider, Historical       No Known Allergies       Tobacco History:  reports that she has never smoked. She has never used smokeless tobacco.  Alcohol use:  reports previous alcohol use. Drug use:  reports no history of drug use. Family Medical/Cancer History:   Family History   Problem Relation Age of Onset    Sickle Cell Anemia Mother     Hypertension Father     Heart Failure Father     Heart Attack Brother     Seizures Brother           Review of Systems   Constitutional: Negative for chills, fever, malaise/fatigue and weight loss. HENT: Negative for congestion, ear pain, sinus pain and tinnitus. Eyes: Negative for blurred vision and double vision. Respiratory: Negative for cough, shortness of breath and wheezing. Cardiovascular: Negative for chest pain and palpitations. Gastrointestinal: Negative for abdominal pain, blood in stool, constipation, diarrhea, heartburn, nausea and vomiting. Genitourinary: Negative for dysuria, flank pain, frequency, hematuria and urgency. Musculoskeletal: Negative for joint pain and myalgias. Skin: Negative for itching and rash. Neurological: Negative for dizziness, weakness and headaches. Psychiatric/Behavioral: Negative for depression, memory loss and suicidal ideas. The patient is not nervous/anxious and does not have insomnia. Physical Exam  Constitutional:       Appearance: Normal appearance. HENT:      Head: Normocephalic and atraumatic. Cardiovascular:      Rate and Rhythm: Normal rate. Heart sounds: Normal heart sounds. Pulmonary:      Effort: Pulmonary effort is normal.      Breath sounds: Normal breath sounds. Chest:   Breasts:      Right: Normal.      Left: Normal.       Abdominal:      General: Abdomen is flat. Palpations: Abdomen is soft. Genitourinary:     General: Normal vulva.       Vagina: Vaginal discharge present. Cervix: Normal.      Uterus: Normal.       Adnexa: Right adnexa normal and left adnexa normal.      Rectum: Normal.      Comments: PAP Obtained  Neurological:      Mental Status: She is alert. Psychiatric:         Mood and Affect: Mood normal.         Behavior: Behavior normal.         Thought Content: Thought content normal.          Visit Vitals  /66 (BP 1 Location: Left upper arm, BP Patient Position: Sitting, BP Cuff Size: Small adult)   Ht 5' 3.5\" (1.613 m)   Wt 138 lb 6 oz (62.8 kg)   BMI 24.13 kg/m²         Assessment:   Diagnoses and all orders for this visit:    1. Screening for malignant neoplasm of cervix  -     PAP IG, CT-NG, RFX APTIMA HPV ASCUS ([id number], [id number])    2. Routine gynecological examination  -     PAP IG, CT-NG, RFX APTIMA HPV ASCUS ([id number], [id number])    3. Screening examination for venereal disease  -     PAP IG, CT-NG, RFX APTIMA HPV ASCUS ([id number], [id number])    4. Menopausal syndrome    5. Vaginal itching    Other orders  -     fluconazole (DIFLUCAN) 150 mg tablet; Take 1 Tablet by mouth daily for 1 day. FDA advises cautious prescribing of oral fluconazole in pregnancy. Plan: Questions addressed  Counseled re: diet, exercise, healthy lifestyle  Return for Annual  Rec annual mammogram      Follow-up and Dispositions    · Return for Mammogram, 1 yr annual, 1 yr mammo.

## 2022-04-25 NOTE — PROGRESS NOTES
Chief Complaint   Patient presents with    Annual Exam     Visit Vitals  /66 (BP 1 Location: Left upper arm, BP Patient Position: Sitting, BP Cuff Size: Small adult)   Ht 5' 3.5\" (1.613 m)   Wt 138 lb 6 oz (62.8 kg)   BMI 24.13 kg/m²

## 2022-04-27 LAB
C TRACH RRNA CVX QL NAA+PROBE: NEGATIVE
CYTOLOGIST CVX/VAG CYTO: NORMAL
CYTOLOGY CVX/VAG DOC CYTO: NORMAL
CYTOLOGY CVX/VAG DOC THIN PREP: NORMAL
DX ICD CODE: NORMAL
LABCORP, 190119: NORMAL
Lab: NORMAL
N GONORRHOEA RRNA CVX QL NAA+PROBE: NEGATIVE
OTHER STN SPEC: NORMAL
STAT OF ADQ CVX/VAG CYTO-IMP: NORMAL

## 2022-08-05 NOTE — THERAPY DISCHARGE
[address]  Ph: [phone]  Fax: [phone]    Medicaid Discharge Summary 2-15    Patient name: Leo Duran  : 1970  Provider#: [id number]  Referral source: Lamont Nunez PA-C      Medical/Treatment Diagnosis: Other low back pain [M54.59]     Prior Hospitalization: see medical history     Comorbidities: See Plan of Care  Prior Level of Function: See Plan of Care  Medications: Verified on Patient Summary List  Start of Care: 22   Onset Date:   Visits from Start of Care: 6 Missed Visits: 2  Certification Period : 22-22  Assessment/Summary of care/GOALS:   Patient did not return for follow up visits or respond to our calls. The last treatment was on 22, and so the patient will be discharged at this time. Goals were not re-assessed. Thank you for the referral.      Patient Goal (s): To alleviate some of the back pain    [] Met [] Not met [] Partially met     Short Term Goals: To be accomplished in 2-6  treatments. 1. Patient will be independent with her HEP to progress with POC. [x] Met [] Not met [] Partially met (stated she does them but they don't help)  2. Patient will reports decreased back pain to < 4/10 on the VAS scale. [] Met [x] Not met [] Partially met     Long Term Goals: To be accomplished in 12-16  treatments. 1. Patient strength will increase by 1/2 to 1 grade to improve mobility. [] Met [] Not met [] Partially met  2. Patient will be able to do a SLS >15 sec on the left LE . [] Met [] Not met [] Partially met  3. Patient will be able to demonstrate good lifting technique from the floor. [x] Met [] Not met [] Partially met  4. Patient will be able to care for her 31 y/o special needs daughter and report decreased pain while doing her ADL's. [] Met [x] Not met [] Partially met  5.  Patient will be able to do her household activities and ADLs: such as mopping and washing her hair with report of decreased lower back pain. [] Met [x] Not met [] Partially met Gave patient HO on proper technique for  Household activities. 6 Patient will be able to bend forwards and  objects from the floor with less back pain and discomfort. [] Met [x] Not met [] Partially met       RECOMMENDATIONS:  [x]Discontinue therapy:   []Patient has reached or is progressing toward set goals and is independent with HEP   []Patient is non-compliant or has abdicated   [x]Due to lack of appreciable progress towards set goals   []Patient was hospitalized or suffered illness that impacted ability to continue therapy   []Other:  Kaitlin Rodrigues, PT, DPT 8/5/2022   Retain this original for your records. If you are unable to process this request in 24 hours, please contact our office.   ________________________________________________________________________  NOTE TO PHYSICIAN:  Please complete the following and FAX to: [facility]:  Fax: [phone]   ____ I have read the above report and request that my patient be discharged from therapy.     Physician's Signature:_____________________________________________________ Date:_____________Time:_____________     Tyler Hankins PA-C

## 2022-08-15 ENCOUNTER — HOSPITAL ENCOUNTER (EMERGENCY)
Age: 52
Discharge: HOME | End: 2022-08-15
Attending: EMERGENCY MEDICINE
Payer: MEDICAID

## 2022-08-15 ENCOUNTER — APPOINTMENT (OUTPATIENT)
Dept: CT IMAGING | Age: 52
End: 2022-08-15
Attending: EMERGENCY MEDICINE
Payer: MEDICAID

## 2022-08-15 VITALS
HEART RATE: 95 BPM | HEIGHT: 62 IN | TEMPERATURE: 99 F | BODY MASS INDEX: 24.84 KG/M2 | SYSTOLIC BLOOD PRESSURE: 119 MMHG | DIASTOLIC BLOOD PRESSURE: 83 MMHG | OXYGEN SATURATION: 99 % | RESPIRATION RATE: 18 BRPM | WEIGHT: 135 LBS

## 2022-08-15 DIAGNOSIS — S39.012A STRAIN OF LUMBAR REGION, INITIAL ENCOUNTER: ICD-10-CM
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
## 2022-08-15 PROCEDURE — 74011250637 HC RX REV CODE- 250/637: Performed by: EMERGENCY MEDICINE

## 2022-08-15 PROCEDURE — 99284 EMERGENCY DEPT VISIT MOD MDM: CPT

## 2022-08-15 PROCEDURE — 72131 CT LUMBAR SPINE W/O DYE: CPT

## 2022-08-15 RX ORDER — ETODOLAC 200 MG/1
200 CAPSULE ORAL 2 TIMES DAILY
COMMUNITY
Start: 2022-05-25

## 2022-08-15 RX ORDER — IBUPROFEN 600 MG/1
600 TABLET ORAL
Qty: 20 TABLET | Refills: 0 | Status: SHIPPED | OUTPATIENT
Start: 2022-08-15

## 2022-08-15 RX ORDER — IBUPROFEN 600 MG/1
600 TABLET ORAL ONCE
Status: COMPLETED | OUTPATIENT
Start: 2022-08-15 | End: 2022-08-15

## 2022-08-15 RX ORDER — CYCLOBENZAPRINE HCL 5 MG
5 TABLET ORAL 2 TIMES DAILY
Qty: 10 TABLET | Refills: 0 | Status: SHIPPED | OUTPATIENT
Start: 2022-08-15

## 2022-08-15 RX ADMIN — IBUPROFEN 600 MG: 600 TABLET ORAL at 13:13
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
## 2024-02-09 ENCOUNTER — HOSPITAL ENCOUNTER (EMERGENCY)
Facility: HOSPITAL | Age: 54
Discharge: HOME | End: 2024-02-09
Attending: STUDENT IN AN ORGANIZED HEALTH CARE EDUCATION/TRAINING PROGRAM
Payer: MEDICAID

## 2024-02-09 VITALS
RESPIRATION RATE: 18 BRPM | OXYGEN SATURATION: 100 % | TEMPERATURE: 98.1 F | DIASTOLIC BLOOD PRESSURE: 72 MMHG | HEART RATE: 83 BPM | WEIGHT: 137 LBS | HEIGHT: 62 IN | SYSTOLIC BLOOD PRESSURE: 112 MMHG | BODY MASS INDEX: 25.21 KG/M2

## 2024-02-09 DIAGNOSIS — H10.9 BACTERIAL CONJUNCTIVITIS: Primary | ICD-10-CM

## 2024-02-09 PROCEDURE — 99283 EMERGENCY DEPT VISIT LOW MDM: CPT

## 2024-02-09 RX ORDER — POLYMYXIN B SULFATE AND TRIMETHOPRIM 1; 10000 MG/ML; [USP'U]/ML
1 SOLUTION OPHTHALMIC EVERY 4 HOURS
Qty: 3 ML | Refills: 0 | Status: SHIPPED | OUTPATIENT
Start: 2024-02-09 | End: 2024-02-19

## 2024-02-09 ASSESSMENT — LIFESTYLE VARIABLES
HOW MANY STANDARD DRINKS CONTAINING ALCOHOL DO YOU HAVE ON A TYPICAL DAY: PATIENT DOES NOT DRINK
HOW OFTEN DO YOU HAVE A DRINK CONTAINING ALCOHOL: NEVER

## 2024-02-09 ASSESSMENT — PAIN SCALES - GENERAL: PAINLEVEL_OUTOF10: 8

## 2024-02-09 ASSESSMENT — PAIN - FUNCTIONAL ASSESSMENT: PAIN_FUNCTIONAL_ASSESSMENT: 0-10

## 2024-02-09 NOTE — ED PROVIDER NOTES
[facility] EMERGENCY DEPT  EMERGENCY DEPARTMENT HISTORY AND PHYSICAL EXAM      Date: 2/9/2024  Patient Name: Brenda Peña  MRN: [id number]  Birthdate 1970  Date of evaluation: 2/9/2024  Provider: Kingsley Davidson MD   Note Started: 10:19 AM EST 2/9/24    HISTORY OF PRESENT ILLNESS     Chief Complaint   Patient presents with    Eye Problem       History Provided By: Patient    HPI: Brenda Peña is a 53 y.o. female with past medical history as below presents for evaluation of eye drainage and itching.  Symptoms present since this morning.  Left eye with redness and drainage.  No vision changes.  She has had upper respiratory congestion recently, no productive cough, no fevers, no other complaints    PAST MEDICAL HISTORY   Past Medical History:  Past Medical History:   Diagnosis Date    DDD (degenerative disc disease), lumbosacral     Thyroid disease        Past Surgical History:  Past Surgical History:   Procedure Laterality Date    APPENDECTOMY      PELVIC LAPAROSCOPY      SALPINGO-OOPHORECTOMY      LS/RSO    VAG HYST,RMV TUBE/OVARY      WISDOM TOOTH EXTRACTION         Family History:  Family History   Problem Relation Age of Onset    Hypertension Father     Heart Failure Father     Heart Attack Brother     Seizures Brother     Sickle Cell Anemia Mother        Social History:  Social History     Tobacco Use    Smoking status: Never    Smokeless tobacco: Never   Substance Use Topics    Alcohol use: Not Currently    Drug use: Never       Allergies:  No Known Allergies    PCP: Oumou Keita APRN - NP    Current Meds:   No current facility-administered medications for this encounter.     Current Outpatient Medications   Medication Sig Dispense Refill    trimethoprim-polymyxin b (POLYTRIM) 30510-2.1 UNIT/ML-% ophthalmic solution Place 1 drop into the left eye every 4 hours for 10 days 3 mL 0    cyclobenzaprine (FLEXERIL) 5 MG tablet Take 5 mg by mouth 2 times daily      etodolac (LODINE) 200 MG  discharge instructions have been discussed, understanding conveyed, and agreed upon. The patient is to follow up as recommended or return to ER should their symptoms worsen.      PATIENT REFERRED TO:  [facility]  [phone]  [facility] [address], [place] [address]  Schedule an appointment as soon as possible for a visit   As needed, If symptoms worsen        DISCHARGE MEDICATIONS:     Medication List        START taking these medications      trimethoprim-polymyxin b 29911-7.1 UNIT/ML-% ophthalmic solution  Commonly known as: POLYTRIM  Place 1 drop into the left eye every 4 hours for 10 days            ASK your doctor about these medications      cyclobenzaprine 5 MG tablet  Commonly known as: FLEXERIL     etodolac 200 MG capsule  Commonly known as: LODINE     ibuprofen 600 MG tablet  Commonly known as: ADVIL;MOTRIN               Where to Get Your Medications        These medications were sent to [facility]/pharmacy #[facility] [place], [place] - [address] [facility] [address] - P [phone] - F [phone]  [address] [facility] [address]      Phone: [phone]   trimethoprim-polymyxin b 55661-2.1 UNIT/ML-% ophthalmic solution           DISCONTINUED MEDICATIONS:  Current Discharge Medication List          I am the Primary Clinician of Record. Kingsley Davidson MD (electronically signed)    (Please note that parts of this dictation were completed with voice recognition software. Quite often unanticipated grammatical, syntax, homophones, and other interpretive errors are inadvertently transcribed by the computer software. Please disregards these errors. Please excuse any errors that have escaped final proofreading.)       Kingsley Davidson MD  02/09/24 5593

## 2024-02-19 ENCOUNTER — HOSPITAL ENCOUNTER (EMERGENCY)
Facility: HOSPITAL | Age: 54
Discharge: HOME | End: 2024-02-19
Attending: EMERGENCY MEDICINE
Payer: MEDICAID

## 2024-02-19 ENCOUNTER — APPOINTMENT (OUTPATIENT)
Facility: HOSPITAL | Age: 54
End: 2024-02-19
Payer: MEDICAID

## 2024-02-19 VITALS
RESPIRATION RATE: 16 BRPM | OXYGEN SATURATION: 100 % | HEART RATE: 76 BPM | DIASTOLIC BLOOD PRESSURE: 84 MMHG | BODY MASS INDEX: 25.21 KG/M2 | SYSTOLIC BLOOD PRESSURE: 111 MMHG | TEMPERATURE: 98.4 F | HEIGHT: 62 IN | WEIGHT: 137 LBS

## 2024-02-19 DIAGNOSIS — R07.9 CHEST PAIN, UNSPECIFIED TYPE: Primary | ICD-10-CM

## 2024-02-19 LAB
ALBUMIN SERPL-MCNC: 3.2 G/DL (ref 3.5–5)
ALBUMIN/GLOB SERPL: 0.8 (ref 1.1–2.2)
ALP SERPL-CCNC: 114 U/L (ref 45–117)
ALT SERPL-CCNC: 29 U/L (ref 12–78)
ANION GAP SERPL CALC-SCNC: 2 MMOL/L (ref 5–15)
AST SERPL W P-5'-P-CCNC: 20 U/L (ref 15–37)
BASOPHILS # BLD: 0 K/UL (ref 0–0.1)
BASOPHILS NFR BLD: 0 % (ref 0–1)
BILIRUB SERPL-MCNC: 0.3 MG/DL (ref 0.2–1)
BUN SERPL-MCNC: 10 MG/DL (ref 6–20)
BUN/CREAT SERPL: 10 (ref 12–20)
CA-I BLD-MCNC: 8.9 MG/DL (ref 8.5–10.1)
CHLORIDE SERPL-SCNC: 108 MMOL/L (ref 97–108)
CO2 SERPL-SCNC: 31 MMOL/L (ref 21–32)
CREAT SERPL-MCNC: 0.97 MG/DL (ref 0.55–1.02)
DIFFERENTIAL METHOD BLD: ABNORMAL
EKG ATRIAL RATE: 60 BPM
EKG DIAGNOSIS: NORMAL
EKG P AXIS: 1 DEGREES
EKG P-R INTERVAL: 120 MS
EKG Q-T INTERVAL: 430 MS
EKG QRS DURATION: 72 MS
EKG QTC CALCULATION (BAZETT): 430 MS
EKG R AXIS: 11 DEGREES
EKG T AXIS: 19 DEGREES
EKG VENTRICULAR RATE: 60 BPM
EOSINOPHIL # BLD: 0 K/UL (ref 0–0.4)
EOSINOPHIL NFR BLD: 0 % (ref 0–7)
ERYTHROCYTE [DISTWIDTH] IN BLOOD BY AUTOMATED COUNT: 12.9 % (ref 11.5–14.5)
GLOBULIN SER CALC-MCNC: 4.2 G/DL (ref 2–4)
GLUCOSE SERPL-MCNC: 147 MG/DL (ref 65–100)
HCT VFR BLD AUTO: 41.8 % (ref 35–47)
HGB BLD-MCNC: 13.4 G/DL (ref 11.5–16)
IMM GRANULOCYTES # BLD AUTO: 0 K/UL
IMM GRANULOCYTES NFR BLD AUTO: 0 %
LYMPHOCYTES # BLD: 5.9 K/UL (ref 0.8–3.5)
LYMPHOCYTES NFR BLD: 47 % (ref 12–49)
MCH RBC QN AUTO: 29.3 PG (ref 26–34)
MCHC RBC AUTO-ENTMCNC: 32.1 G/DL (ref 30–36.5)
MCV RBC AUTO: 91.5 FL (ref 80–99)
MONOCYTES # BLD: 0.5 K/UL (ref 0–1)
MONOCYTES NFR BLD: 4 % (ref 5–13)
NEUTS SEG # BLD: 6.1 K/UL (ref 1.8–8)
NEUTS SEG NFR BLD: 49 % (ref 32–75)
NRBC # BLD: 0 K/UL (ref 0–0.01)
NRBC BLD-RTO: 0 PER 100 WBC
PLATELET # BLD AUTO: 470 K/UL (ref 150–400)
PMV BLD AUTO: 9 FL (ref 8.9–12.9)
POTASSIUM SERPL-SCNC: 3.8 MMOL/L (ref 3.5–5.1)
PROT SERPL-MCNC: 7.4 G/DL (ref 6.4–8.2)
RBC # BLD AUTO: 4.57 M/UL (ref 3.8–5.2)
RBC MORPH BLD: ABNORMAL
SODIUM SERPL-SCNC: 141 MMOL/L (ref 136–145)
TROPONIN I SERPL HS-MCNC: 14 NG/L (ref 0–51)
WBC # BLD AUTO: 12.5 K/UL (ref 3.6–11)
WBC MORPH BLD: ABNORMAL

## 2024-02-19 PROCEDURE — 36415 COLL VENOUS BLD VENIPUNCTURE: CPT

## 2024-02-19 PROCEDURE — 84484 ASSAY OF TROPONIN QUANT: CPT

## 2024-02-19 PROCEDURE — 99285 EMERGENCY DEPT VISIT HI MDM: CPT

## 2024-02-19 PROCEDURE — 6370000000 HC RX 637 (ALT 250 FOR IP): Performed by: EMERGENCY MEDICINE

## 2024-02-19 PROCEDURE — 80053 COMPREHEN METABOLIC PANEL: CPT

## 2024-02-19 PROCEDURE — 93005 ELECTROCARDIOGRAM TRACING: CPT | Performed by: EMERGENCY MEDICINE

## 2024-02-19 PROCEDURE — 85025 COMPLETE CBC W/AUTO DIFF WBC: CPT

## 2024-02-19 PROCEDURE — 71045 X-RAY EXAM CHEST 1 VIEW: CPT

## 2024-02-19 RX ORDER — LIDOCAINE HYDROCHLORIDE 20 MG/ML
15 SOLUTION OROPHARYNGEAL EVERY 4 HOURS PRN
Qty: 250 ML | Refills: 0 | Status: SHIPPED | OUTPATIENT
Start: 2024-02-19

## 2024-02-19 RX ORDER — ASPIRIN 325 MG
325 TABLET ORAL
Status: COMPLETED | OUTPATIENT
Start: 2024-02-19 | End: 2024-02-19

## 2024-02-19 RX ORDER — ONDANSETRON 4 MG/1
4 TABLET, ORALLY DISINTEGRATING ORAL EVERY 8 HOURS PRN
Qty: 20 TABLET | Refills: 0 | Status: SHIPPED | OUTPATIENT
Start: 2024-02-19

## 2024-02-19 RX ADMIN — ASPIRIN 325 MG: 325 TABLET ORAL at 01:18

## 2024-02-19 ASSESSMENT — PAIN SCALES - GENERAL: PAINLEVEL_OUTOF10: 10

## 2024-02-19 ASSESSMENT — HEART SCORE: ECG: 0

## 2024-02-19 ASSESSMENT — PAIN - FUNCTIONAL ASSESSMENT: PAIN_FUNCTIONAL_ASSESSMENT: 0-10

## 2024-02-19 ASSESSMENT — PAIN DESCRIPTION - LOCATION: LOCATION: CHEST

## 2024-02-19 NOTE — ED TRIAGE NOTES
Pt complains of chest pain that started a hour ago while laying down. Denies SOB. States she thought it was gas pains. Denies any cardiac hx.

## 2024-02-19 NOTE — DISCHARGE INSTRUCTIONS
Thank you!  Thank you for allowing me to care for you in the emergency department. It is my goal to provide you with excellent care.  Please fill out the survey that will come to you by mail or email since we listen to your feedback!     Below you will find a list of your tests from today's visit.  Should you have any questions, please do not hesitate to call the emergency department.    Labs  Recent Results (from the past 12 hour(s))   CBC with Auto Differential    Collection Time: 02/19/24  1:04 AM   Result Value Ref Range    WBC 12.5 (H) 3.6 - 11.0 K/uL    RBC 4.57 3.80 - 5.20 M/uL    Hemoglobin 13.4 11.5 - 16.0 g/dL    Hematocrit 41.8 35.0 - 47.0 %    MCV 91.5 80.0 - 99.0 FL    MCH 29.3 26.0 - 34.0 PG    MCHC 32.1 30.0 - 36.5 g/dL    RDW 12.9 11.5 - 14.5 %    Platelets 470 (H) 150 - 400 K/uL    MPV 9.0 8.9 - 12.9 FL    Nucleated RBCs 0.0 0.0  WBC    nRBC 0.00 0.00 - 0.01 K/uL    Neutrophils % 49 32 - 75 %    Lymphocytes % 47 12 - 49 %    Monocytes % 4 (L) 5 - 13 %    Eosinophils % 0 0 - 7 %    Basophils % 0 0 - 1 %    Immature Granulocytes 0 %    Neutrophils Absolute 6.1 1.8 - 8.0 K/UL    Lymphocytes Absolute 5.9 (H) 0.8 - 3.5 K/UL    Monocytes Absolute 0.5 0.0 - 1.0 K/UL    Eosinophils Absolute 0.0 0.0 - 0.4 K/UL    Basophils Absolute 0.0 0.0 - 0.1 K/UL    Absolute Immature Granulocyte 0.0 K/UL    Differential Type Manual      RBC Comment Normocytic, Normochromic      WBC Comment Reactive Lymphs     Comprehensive Metabolic Panel    Collection Time: 02/19/24  1:04 AM   Result Value Ref Range    Sodium 141 136 - 145 mmol/L    Potassium 3.8 3.5 - 5.1 mmol/L    Chloride 108 97 - 108 mmol/L    CO2 31 21 - 32 mmol/L    Anion Gap 2 (L) 5 - 15 mmol/L    Glucose 147 (H) 65 - 100 mg/dL    BUN 10 6 - 20 mg/dL    Creatinine 0.97 0.55 - 1.02 mg/dL    Bun/Cre Ratio 10 (L) 12 - 20      Est, Glom Filt Rate >60 >60 ml/min/1.73m2    Calcium 8.9 8.5 - 10.1 mg/dL    Total Bilirubin 0.3 0.2 - 1.0 mg/dL    AST 20 15 - 37

## 2024-02-19 NOTE — ED PROVIDER NOTES
[facility] [place]GENCY DEPT  EMERGENCY DEPARTMENT HISTORY AND PHYSICAL EXAM      Date: 2/19/2024  Patient Name: Brenda Peña  MRN: [id number]  Birthdate 1970  Date of evaluation: 2/19/2024  Provider: Yaniv Bearden MD   Note Started: 1:19 AM EST 2/19/24    HISTORY OF PRESENT ILLNESS     Chief Complaint   Patient presents with    Chest Pain       History Provided By: Patient    HPI: Brenda Peña is a 53 y.o. female presents to the emergency department complaint of chest pain that started an hour ago.  Patient reports that originated in the epigastrium and then radiated up the sternum.  Patient denies shortness of breath, denies fevers or chills.  Patient states initially she thought it was epigastric/gas pains.  Patient states the symptoms have now resolved.    PAST MEDICAL HISTORY   Past Medical History:  Past Medical History:   Diagnosis Date    DDD (degenerative disc disease), lumbosacral     Thyroid disease        Past Surgical History:  Past Surgical History:   Procedure Laterality Date    APPENDECTOMY      PELVIC LAPAROSCOPY      SALPINGO-OOPHORECTOMY      LS/RSO    VAG HYST,RMV TUBE/OVARY      WISDOM TOOTH EXTRACTION         Family History:  Family History   Problem Relation Age of Onset    Hypertension Father     Heart Failure Father     Heart Attack Brother     Seizures Brother     Sickle Cell Anemia Mother        Social History:  Social History     Tobacco Use    Smoking status: Never    Smokeless tobacco: Never   Substance Use Topics    Alcohol use: Not Currently    Drug use: Never       Allergies:  No Known Allergies    PCP: Oumou Keita APRN - NP    Current Meds:   No current facility-administered medications for this encounter.     Current Outpatient Medications   Medication Sig Dispense Refill    ondansetron (ZOFRAN-ODT) 4 MG disintegrating tablet Place 1 tablet under the tongue every 8 hours as needed for Nausea or Vomiting 20 tablet 0    lidocaine viscous hcl (XYLOCAINE) 2

## 2024-04-15 ENCOUNTER — HOSPITAL ENCOUNTER (EMERGENCY)
Facility: HOSPITAL | Age: 54
Discharge: HOME | End: 2024-04-15
Payer: MEDICAID

## 2024-04-15 VITALS
SYSTOLIC BLOOD PRESSURE: 98 MMHG | TEMPERATURE: 98.6 F | DIASTOLIC BLOOD PRESSURE: 71 MMHG | OXYGEN SATURATION: 98 % | RESPIRATION RATE: 18 BRPM | HEIGHT: 62 IN | WEIGHT: 134 LBS | BODY MASS INDEX: 24.66 KG/M2 | HEART RATE: 94 BPM

## 2024-04-15 DIAGNOSIS — K04.7 DENTAL ABSCESS: Primary | ICD-10-CM

## 2024-04-15 PROCEDURE — 99283 EMERGENCY DEPT VISIT LOW MDM: CPT

## 2024-04-15 RX ORDER — AMOXICILLIN AND CLAVULANATE POTASSIUM 875; 125 MG/1; MG/1
1 TABLET, FILM COATED ORAL 2 TIMES DAILY
Qty: 14 TABLET | Refills: 0 | Status: SHIPPED | OUTPATIENT
Start: 2024-04-15 | End: 2024-04-22

## 2024-04-15 ASSESSMENT — LIFESTYLE VARIABLES
HOW OFTEN DO YOU HAVE A DRINK CONTAINING ALCOHOL: NEVER
HOW MANY STANDARD DRINKS CONTAINING ALCOHOL DO YOU HAVE ON A TYPICAL DAY: PATIENT DOES NOT DRINK

## 2024-04-15 ASSESSMENT — PAIN SCALES - GENERAL: PAINLEVEL_OUTOF10: 7

## 2024-04-15 ASSESSMENT — PAIN - FUNCTIONAL ASSESSMENT: PAIN_FUNCTIONAL_ASSESSMENT: 0-10

## 2024-04-15 NOTE — ED TRIAGE NOTES
Pt c/o possible abscess to the right side of pts upper gum. Pt denies nay drainage, reports swelling.

## 2024-04-15 NOTE — ED PROVIDER NOTES
[facility] EMERGENCY DEPT  EMERGENCY DEPARTMENT HISTORY AND PHYSICAL EXAM      Date: 4/15/2024  Patient Name: Brenda Peña  MRN: [id number]  YOB: 1970  Date of evaluation: 4/15/2024  Provider: RAMONE Phelps   Note Started: 8:17 AM EDT 4/15/24    HISTORY OF PRESENT ILLNESS     Chief Complaint   Patient presents with    Oral Swelling       History Provided By: Patient    HPI: Brenda Peña is a 53 y.o. female with past medical history as listed below who presents to the ED complaining of dental pain and swelling x 3 days. She reports that these symptoms started suddenly. She localizes the pain and swelling to her upper gums. She states that she does have a history of dental caries in her upper teeth; however, she has not seen a dentist in a few years. She denies any fever/chills, chest pain, shortness of breath, or difficulty swallowing. She has taken tylenol with minimal relief.     PAST MEDICAL HISTORY   Past Medical History:  Past Medical History:   Diagnosis Date    DDD (degenerative disc disease), lumbosacral     Thyroid disease        Past Surgical History:  Past Surgical History:   Procedure Laterality Date    APPENDECTOMY      PELVIC LAPAROSCOPY      SALPINGO-OOPHORECTOMY      LS/RSO    VAG HYST,RMV TUBE/OVARY      WISDOM TOOTH EXTRACTION         Family History:  Family History   Problem Relation Age of Onset    Hypertension Father     Heart Failure Father     Heart Attack Brother     Seizures Brother     Sickle Cell Anemia Mother        Social History:  Social History     Tobacco Use    Smoking status: Never    Smokeless tobacco: Never   Substance Use Topics    Alcohol use: Not Currently    Drug use: Never       Allergies:  No Known Allergies    PCP: Oumou Keita, ARCADIO - NP    Current Meds:   No current facility-administered medications for this encounter.     Current Outpatient Medications   Medication Sig Dispense Refill    amoxicillin-clavulanate (AUGMENTIN) 875-125 MG per

## 2025-07-11 ENCOUNTER — HOSPITAL ENCOUNTER (EMERGENCY)
Facility: HOSPITAL | Age: 55
Discharge: HOME | End: 2025-07-11

## 2025-07-11 VITALS
SYSTOLIC BLOOD PRESSURE: 117 MMHG | HEART RATE: 98 BPM | DIASTOLIC BLOOD PRESSURE: 84 MMHG | OXYGEN SATURATION: 99 % | TEMPERATURE: 98.1 F | BODY MASS INDEX: 25.21 KG/M2 | HEIGHT: 62 IN | WEIGHT: 137 LBS | RESPIRATION RATE: 16 BRPM

## 2025-07-11 DIAGNOSIS — K08.89 DENTALGIA: Primary | ICD-10-CM

## 2025-07-11 DIAGNOSIS — K02.9 DENTAL DECAY: ICD-10-CM

## 2025-07-11 PROCEDURE — 99283 EMERGENCY DEPT VISIT LOW MDM: CPT

## 2025-07-11 PROCEDURE — 6370000000 HC RX 637 (ALT 250 FOR IP): Performed by: PHYSICIAN ASSISTANT

## 2025-07-11 RX ORDER — LIDOCAINE HYDROCHLORIDE 20 MG/ML
15 SOLUTION OROPHARYNGEAL
Status: COMPLETED | OUTPATIENT
Start: 2025-07-11 | End: 2025-07-11

## 2025-07-11 RX ORDER — IBUPROFEN 800 MG/1
800 TABLET, FILM COATED ORAL EVERY 8 HOURS PRN
Qty: 20 TABLET | Refills: 0 | Status: SHIPPED | OUTPATIENT
Start: 2025-07-11

## 2025-07-11 RX ORDER — DIPHENHYDRAMINE HCL 12.5MG/5ML
12.5 LIQUID (ML) ORAL
Status: COMPLETED | OUTPATIENT
Start: 2025-07-11 | End: 2025-07-11

## 2025-07-11 RX ORDER — ACETAMINOPHEN 500 MG
1000 TABLET ORAL 4 TIMES DAILY PRN
Qty: 40 TABLET | Refills: 0 | Status: SHIPPED | OUTPATIENT
Start: 2025-07-11

## 2025-07-11 RX ORDER — TRAMADOL HYDROCHLORIDE 50 MG/1
50 TABLET ORAL EVERY 6 HOURS PRN
Qty: 12 TABLET | Refills: 0 | Status: SHIPPED | OUTPATIENT
Start: 2025-07-11 | End: 2025-07-14

## 2025-07-11 RX ADMIN — BENZOCAINE, BUTAMBEN, AND TETRACAINE HYDROCHLORIDE 1 SPRAY: .028; .004; .004 AEROSOL, SPRAY TOPICAL at 11:51

## 2025-07-11 RX ADMIN — DIPHENHYDRAMINE HYDROCHLORIDE 12.5 MG: 25 SOLUTION ORAL at 11:51

## 2025-07-11 RX ADMIN — LIDOCAINE HYDROCHLORIDE 15 ML: 20 SOLUTION ORAL at 11:51

## 2025-07-11 ASSESSMENT — PAIN SCALES - GENERAL: PAINLEVEL_OUTOF10: 9

## 2025-07-11 NOTE — DISCHARGE INSTRUCTIONS
Dentist/Dental resources:    [facility] [place]  [address] [place] [address] [phone]    [facility]  [address], [address]  [place] [address] [phone]    [facility]  [address] [place] [address]  [phone]    [facility]   [address] [facility] [address]   Phone [phone] or [phone]   Hours [id number] (extractions)   Simple tooth extraction: $60 per tooth, $55 per x-ray     Non-Urgent Dental Care Clinics   [facility] at [address] [address]   Dental Clinic: [phone]   Oral Surgery Clinic: [phone]     Emergency Dental Care   [facility]   [address] [address]   Monday, Wednesday, Friday: 8am-5pm   Tuesday and Thursday: 8am-6pm   Phone: [phone]   $70 for Emergency Care   $60 for first routine care, then pay by sliding scale based upon income     [facility]   [address] [place] [address]   Phone: [phone], select option (2) to confirm time for treatment     [facility]   [address] [place] [address]   Monday-Friday: 8am-4pm   Phone: [phone]     [facility]   [facility] [address]   Phone: [phone] to confirm a time for emergency treatment   Pediatrics: [phone]   $75 per tooth, extractions only

## 2025-07-11 NOTE — ED PROVIDER NOTES
[facility] EMERGENCY DEPT  EMERGENCY DEPARTMENT HISTORY AND PHYSICAL EXAM      Date of evaluation: 7/11/2025  Patient Name: Brenda Peña  Birthdate 1970  MRN: [id number]  ED Provider: Kang Loomis PA-C   Note Started: 11:42 AM EDT 7/11/25    HISTORY OF PRESENT ILLNESS     Chief Complaint   Patient presents with    Dental Pain       History Provided By: Patient, only     HPI: Brenda Peña is a 54 y.o. female with a past medical she is listed below presents to this ED with cc of dental pain.  Patient reports a 1 day history of pain to the right lower side of her mouth.  Reports having several \"bad teeth which need pulled\".  Currently followed by dentist.  No fevers or chills.  Specifically denies any cough, congestion, sore throat, difficulty managing secretions, drooling, voice change, ear pain, headaches lightheadedness, dizziness, rashes.  Reports otherwise being well is no further concerns.    PAST MEDICAL HISTORY   Past Medical History:  Past Medical History:   Diagnosis Date    DDD (degenerative disc disease), lumbosacral     Thyroid disease        Past Surgical History:  Past Surgical History:   Procedure Laterality Date    APPENDECTOMY      PELVIC LAPAROSCOPY      SALPINGO-OOPHORECTOMY      LS/RSO    VAG HYST,RMV TUBE/OVARY      WISDOM TOOTH EXTRACTION         Family History:  Family History   Problem Relation Age of Onset    Hypertension Father     Heart Failure Father     Heart Attack Brother     Seizures Brother     Sickle Cell Anemia Mother        Social History:  Social History     Tobacco Use    Smoking status: Never    Smokeless tobacco: Never   Substance Use Topics    Alcohol use: Not Currently    Drug use: Never       Allergies:  No Known Allergies    PCP: Oumou Keita, ARCADIO - NP    Current Meds:   No current facility-administered medications for this encounter.     Current Outpatient Medications   Medication Sig Dispense Refill    ibuprofen (ADVIL;MOTRIN) 800 MG tablet Take 1